# Patient Record
Sex: MALE | Race: OTHER
[De-identification: names, ages, dates, MRNs, and addresses within clinical notes are randomized per-mention and may not be internally consistent; named-entity substitution may affect disease eponyms.]

---

## 2021-05-12 ENCOUNTER — TRANSCRIPTION ENCOUNTER (OUTPATIENT)
Age: 71
End: 2021-05-12

## 2021-10-09 ENCOUNTER — TRANSCRIPTION ENCOUNTER (OUTPATIENT)
Age: 71
End: 2021-10-09

## 2021-11-08 PROBLEM — Z00.00 ENCOUNTER FOR PREVENTIVE HEALTH EXAMINATION: Status: ACTIVE | Noted: 2021-11-08

## 2022-05-29 ENCOUNTER — NON-APPOINTMENT (OUTPATIENT)
Age: 72
End: 2022-05-29

## 2022-07-10 ENCOUNTER — NON-APPOINTMENT (OUTPATIENT)
Age: 72
End: 2022-07-10

## 2023-07-04 ENCOUNTER — NON-APPOINTMENT (OUTPATIENT)
Age: 73
End: 2023-07-04

## 2023-08-26 ENCOUNTER — NON-APPOINTMENT (OUTPATIENT)
Age: 73
End: 2023-08-26

## 2023-09-15 ENCOUNTER — OUTPATIENT (OUTPATIENT)
Dept: OUTPATIENT SERVICES | Facility: HOSPITAL | Age: 73
LOS: 1 days | Discharge: HOME | End: 2023-09-15
Payer: MEDICARE

## 2023-09-15 VITALS
HEIGHT: 69 IN | HEART RATE: 82 BPM | RESPIRATION RATE: 17 BRPM | OXYGEN SATURATION: 97 % | SYSTOLIC BLOOD PRESSURE: 164 MMHG | TEMPERATURE: 97 F | DIASTOLIC BLOOD PRESSURE: 75 MMHG | WEIGHT: 199.96 LBS

## 2023-09-15 VITALS — HEART RATE: 71 BPM | RESPIRATION RATE: 15 BRPM | SYSTOLIC BLOOD PRESSURE: 186 MMHG | DIASTOLIC BLOOD PRESSURE: 76 MMHG

## 2023-09-15 DIAGNOSIS — Z98.890 OTHER SPECIFIED POSTPROCEDURAL STATES: Chronic | ICD-10-CM

## 2023-09-15 DIAGNOSIS — H25.12 AGE-RELATED NUCLEAR CATARACT, LEFT EYE: ICD-10-CM

## 2023-09-15 DIAGNOSIS — Z90.5 ACQUIRED ABSENCE OF KIDNEY: Chronic | ICD-10-CM

## 2023-09-15 DIAGNOSIS — Z98.49 CATARACT EXTRACTION STATUS, UNSPECIFIED EYE: Chronic | ICD-10-CM

## 2023-09-15 DIAGNOSIS — Z90.49 ACQUIRED ABSENCE OF OTHER SPECIFIED PARTS OF DIGESTIVE TRACT: Chronic | ICD-10-CM

## 2023-09-15 PROCEDURE — V2632: CPT

## 2023-09-15 NOTE — ASU PATIENT PROFILE, ADULT - NSICDXPASTMEDICALHX_GEN_ALL_CORE_FT
PAST MEDICAL HISTORY:  Colon cancer     History of bladder surgery     HLD (hyperlipidemia)     HTN (hypertension)     Prostate cancer     Ureter cancer, right

## 2023-09-15 NOTE — ASU PATIENT PROFILE, ADULT - FALL HARM RISK - HARM RISK INTERVENTIONS

## 2023-09-15 NOTE — ASU PATIENT PROFILE, ADULT - NSICDXPASTSURGICALHX_GEN_ALL_CORE_FT
PAST SURGICAL HISTORY:  H/O cataract extraction R iol    H/O right nephrectomy     History of bladder surgery     History of colon resection     History of kidney surgery W/ r ILLEO CONDUIT     PAST SURGICAL HISTORY:  H/O cataract extraction R iol    H/O right nephrectomy     History of bladder surgery     History of colon resection     History of kidney surgery R urostomy

## 2023-09-15 NOTE — ASU DISCHARGE PLAN (ADULT/PEDIATRIC) - NS MD DC FALL RISK RISK
For information on Fall & Injury Prevention, visit: [email or web address] OR  [email or web address] OR  https://www.cdc.gov/steadi/patient.html

## 2023-09-19 DIAGNOSIS — Z90.49 ACQUIRED ABSENCE OF OTHER SPECIFIED PARTS OF DIGESTIVE TRACT: ICD-10-CM

## 2023-09-19 DIAGNOSIS — Z98.49 CATARACT EXTRACTION STATUS, UNSPECIFIED EYE: ICD-10-CM

## 2023-09-19 DIAGNOSIS — E78.5 HYPERLIPIDEMIA, UNSPECIFIED: ICD-10-CM

## 2023-09-19 DIAGNOSIS — Z85.46 PERSONAL HISTORY OF MALIGNANT NEOPLASM OF PROSTATE: ICD-10-CM

## 2023-09-19 DIAGNOSIS — H25.89 OTHER AGE-RELATED CATARACT: ICD-10-CM

## 2023-09-19 DIAGNOSIS — I10 ESSENTIAL (PRIMARY) HYPERTENSION: ICD-10-CM

## 2023-09-19 DIAGNOSIS — Z85.038 PERSONAL HISTORY OF OTHER MALIGNANT NEOPLASM OF LARGE INTESTINE: ICD-10-CM

## 2023-11-16 ENCOUNTER — NON-APPOINTMENT (OUTPATIENT)
Age: 73
End: 2023-11-16

## 2024-04-01 NOTE — ASU PATIENT PROFILE, ADULT - TEACHING/LEARNING RELIGIOUS CONSIDERATIONS
In an effort to ensure that our patients LiveWell, a Team Member has reviewed your chart and identified an opportunity to provide the best care possible. An attempt was made to discuss or schedule overdue Preventive or Disease Management screening.     The Outcome was Contact was not made, left messageIf you have any questions or need help with scheduling, contact our Health Outreach Team at [phone]. Care Gaps include Immunizations and Wellness Visits.     none

## 2024-05-01 ENCOUNTER — NON-APPOINTMENT (OUTPATIENT)
Age: 74
End: 2024-05-01

## 2024-05-20 ENCOUNTER — NON-APPOINTMENT (OUTPATIENT)
Age: 74
End: 2024-05-20

## 2024-10-23 NOTE — ASU PREOP CHECKLIST - WARM FLUIDS/WARM BLANKETS
Detail Level: Detailed
Plan: Will have patient come back for evaluation in 6-8 weeks. Patient states lesions are caused by the bandages from open heart surgery 2 months ago. Will monitor.
no

## 2024-11-24 ENCOUNTER — INPATIENT (INPATIENT)
Facility: HOSPITAL | Age: 74
LOS: 2 days | Discharge: HOME | DRG: 683 | End: 2024-11-27
Payer: MEDICARE

## 2024-11-24 VITALS
DIASTOLIC BLOOD PRESSURE: 72 MMHG | WEIGHT: 190.04 LBS | HEART RATE: 86 BPM | SYSTOLIC BLOOD PRESSURE: 152 MMHG | RESPIRATION RATE: 18 BRPM | OXYGEN SATURATION: 97 % | TEMPERATURE: 98 F

## 2024-11-24 DIAGNOSIS — Z90.5 ACQUIRED ABSENCE OF KIDNEY: Chronic | ICD-10-CM

## 2024-11-24 DIAGNOSIS — Z98.49 CATARACT EXTRACTION STATUS, UNSPECIFIED EYE: Chronic | ICD-10-CM

## 2024-11-24 DIAGNOSIS — N17.9 ACUTE KIDNEY FAILURE, UNSPECIFIED: ICD-10-CM

## 2024-11-24 DIAGNOSIS — Z90.49 ACQUIRED ABSENCE OF OTHER SPECIFIED PARTS OF DIGESTIVE TRACT: Chronic | ICD-10-CM

## 2024-11-24 DIAGNOSIS — Z98.890 OTHER SPECIFIED POSTPROCEDURAL STATES: Chronic | ICD-10-CM

## 2024-11-24 PROBLEM — I10 ESSENTIAL (PRIMARY) HYPERTENSION: Chronic | Status: ACTIVE | Noted: 2023-09-15

## 2024-11-24 PROBLEM — C18.9 MALIGNANT NEOPLASM OF COLON, UNSPECIFIED: Chronic | Status: ACTIVE | Noted: 2023-09-15

## 2024-11-24 PROBLEM — E78.5 HYPERLIPIDEMIA, UNSPECIFIED: Chronic | Status: ACTIVE | Noted: 2023-09-15

## 2024-11-24 PROBLEM — C66.1 MALIGNANT NEOPLASM OF RIGHT URETER: Chronic | Status: ACTIVE | Noted: 2023-09-15

## 2024-11-24 PROBLEM — C61 MALIGNANT NEOPLASM OF PROSTATE: Chronic | Status: ACTIVE | Noted: 2023-09-15

## 2024-11-24 LAB
ALBUMIN SERPL ELPH-MCNC: 4.2 G/DL — SIGNIFICANT CHANGE UP (ref 3.5–5.2)
ALP SERPL-CCNC: 70 U/L — SIGNIFICANT CHANGE UP (ref 30–115)
ALT FLD-CCNC: 13 U/L — SIGNIFICANT CHANGE UP (ref 0–41)
ANION GAP SERPL CALC-SCNC: 14 MMOL/L — SIGNIFICANT CHANGE UP (ref 7–14)
ANION GAP SERPL CALC-SCNC: 17 MMOL/L — HIGH (ref 7–14)
AST SERPL-CCNC: 11 U/L — SIGNIFICANT CHANGE UP (ref 0–41)
BASE EXCESS BLDV CALC-SCNC: -19.6 MMOL/L — LOW (ref -2–3)
BASOPHILS # BLD AUTO: 0.02 K/UL — SIGNIFICANT CHANGE UP (ref 0–0.2)
BASOPHILS NFR BLD AUTO: 0.3 % — SIGNIFICANT CHANGE UP (ref 0–1)
BILIRUB SERPL-MCNC: 0.3 MG/DL — SIGNIFICANT CHANGE UP (ref 0.2–1.2)
BUN SERPL-MCNC: 107 MG/DL — CRITICAL HIGH (ref 10–20)
BUN SERPL-MCNC: 111 MG/DL — CRITICAL HIGH (ref 10–20)
CA-I SERPL-SCNC: 1.26 MMOL/L — SIGNIFICANT CHANGE UP (ref 1.15–1.33)
CALCIUM SERPL-MCNC: 8 MG/DL — LOW (ref 8.4–10.5)
CALCIUM SERPL-MCNC: 8.1 MG/DL — LOW (ref 8.4–10.5)
CHLORIDE SERPL-SCNC: 111 MMOL/L — HIGH (ref 98–110)
CHLORIDE SERPL-SCNC: 117 MMOL/L — HIGH (ref 98–110)
CO2 SERPL-SCNC: 12 MMOL/L — LOW (ref 17–32)
CO2 SERPL-SCNC: 9 MMOL/L — CRITICAL LOW (ref 17–32)
CREAT SERPL-MCNC: 6 MG/DL — CRITICAL HIGH (ref 0.7–1.5)
CREAT SERPL-MCNC: 6.4 MG/DL — CRITICAL HIGH (ref 0.7–1.5)
EGFR: 9 ML/MIN/1.73M2 — LOW
EGFR: 9 ML/MIN/1.73M2 — LOW
EOSINOPHIL # BLD AUTO: 0.34 K/UL — SIGNIFICANT CHANGE UP (ref 0–0.7)
EOSINOPHIL NFR BLD AUTO: 5.4 % — SIGNIFICANT CHANGE UP (ref 0–8)
GAS PNL BLDV: 136 MMOL/L — SIGNIFICANT CHANGE UP (ref 136–145)
GAS PNL BLDV: SIGNIFICANT CHANGE UP
GAS PNL BLDV: SIGNIFICANT CHANGE UP
GLUCOSE SERPL-MCNC: 87 MG/DL — SIGNIFICANT CHANGE UP (ref 70–99)
GLUCOSE SERPL-MCNC: 98 MG/DL — SIGNIFICANT CHANGE UP (ref 70–99)
HCO3 BLDV-SCNC: 9 MMOL/L — CRITICAL LOW (ref 22–29)
HCT VFR BLD CALC: 30.4 % — LOW (ref 42–52)
HCT VFR BLDA CALC: 26 % — LOW (ref 39–51)
HGB BLD CALC-MCNC: 8.5 G/DL — LOW (ref 12.6–17.4)
HGB BLD-MCNC: 9.5 G/DL — LOW (ref 14–18)
IMM GRANULOCYTES NFR BLD AUTO: 0.3 % — SIGNIFICANT CHANGE UP (ref 0.1–0.3)
LACTATE BLDV-MCNC: 0.4 MMOL/L — LOW (ref 0.5–2)
LYMPHOCYTES # BLD AUTO: 1.41 K/UL — SIGNIFICANT CHANGE UP (ref 1.2–3.4)
LYMPHOCYTES # BLD AUTO: 22.4 % — SIGNIFICANT CHANGE UP (ref 20.5–51.1)
MAGNESIUM SERPL-MCNC: 1.9 MG/DL — SIGNIFICANT CHANGE UP (ref 1.8–2.4)
MCHC RBC-ENTMCNC: 27.1 PG — SIGNIFICANT CHANGE UP (ref 27–31)
MCHC RBC-ENTMCNC: 31.3 G/DL — LOW (ref 32–37)
MCV RBC AUTO: 86.6 FL — SIGNIFICANT CHANGE UP (ref 80–94)
MONOCYTES # BLD AUTO: 0.66 K/UL — HIGH (ref 0.1–0.6)
MONOCYTES NFR BLD AUTO: 10.5 % — HIGH (ref 1.7–9.3)
MRSA PCR RESULT.: NEGATIVE — SIGNIFICANT CHANGE UP
NEUTROPHILS # BLD AUTO: 3.84 K/UL — SIGNIFICANT CHANGE UP (ref 1.4–6.5)
NEUTROPHILS NFR BLD AUTO: 61.1 % — SIGNIFICANT CHANGE UP (ref 42.2–75.2)
NRBC # BLD: 0 /100 WBCS — SIGNIFICANT CHANGE UP (ref 0–0)
PCO2 BLDV: 30 MMHG — LOW (ref 42–55)
PH BLDV: 7.08 — CRITICAL LOW (ref 7.32–7.43)
PHOSPHATE SERPL-MCNC: 5.2 MG/DL — HIGH (ref 2.1–4.9)
PLATELET # BLD AUTO: 232 K/UL — SIGNIFICANT CHANGE UP (ref 130–400)
PMV BLD: 9 FL — SIGNIFICANT CHANGE UP (ref 7.4–10.4)
PO2 BLDV: 41 MMHG — SIGNIFICANT CHANGE UP (ref 25–45)
POTASSIUM BLDV-SCNC: 4.6 MMOL/L — SIGNIFICANT CHANGE UP (ref 3.5–5.1)
POTASSIUM SERPL-MCNC: 4 MMOL/L — SIGNIFICANT CHANGE UP (ref 3.5–5)
POTASSIUM SERPL-MCNC: 4.3 MMOL/L — SIGNIFICANT CHANGE UP (ref 3.5–5)
POTASSIUM SERPL-SCNC: 4 MMOL/L — SIGNIFICANT CHANGE UP (ref 3.5–5)
POTASSIUM SERPL-SCNC: 4.3 MMOL/L — SIGNIFICANT CHANGE UP (ref 3.5–5)
PROT SERPL-MCNC: 7.1 G/DL — SIGNIFICANT CHANGE UP (ref 6–8)
RBC # BLD: 3.51 M/UL — LOW (ref 4.7–6.1)
RBC # FLD: 16.3 % — HIGH (ref 11.5–14.5)
SAO2 % BLDV: 65.6 % — LOW (ref 67–88)
SODIUM SERPL-SCNC: 137 MMOL/L — SIGNIFICANT CHANGE UP (ref 135–146)
SODIUM SERPL-SCNC: 143 MMOL/L — SIGNIFICANT CHANGE UP (ref 135–146)
WBC # BLD: 6.29 K/UL — SIGNIFICANT CHANGE UP (ref 4.8–10.8)
WBC # FLD AUTO: 6.29 K/UL — SIGNIFICANT CHANGE UP (ref 4.8–10.8)

## 2024-11-24 PROCEDURE — 82746 ASSAY OF FOLIC ACID SERUM: CPT

## 2024-11-24 PROCEDURE — 85610 PROTHROMBIN TIME: CPT

## 2024-11-24 PROCEDURE — 93010 ELECTROCARDIOGRAM REPORT: CPT

## 2024-11-24 PROCEDURE — 99285 EMERGENCY DEPT VISIT HI MDM: CPT

## 2024-11-24 PROCEDURE — 80048 BASIC METABOLIC PNL TOTAL CA: CPT

## 2024-11-24 PROCEDURE — 85379 FIBRIN DEGRADATION QUANT: CPT

## 2024-11-24 PROCEDURE — 76770 US EXAM ABDO BACK WALL COMP: CPT

## 2024-11-24 PROCEDURE — 76770 US EXAM ABDO BACK WALL COMP: CPT | Mod: 26

## 2024-11-24 PROCEDURE — 36415 COLL VENOUS BLD VENIPUNCTURE: CPT

## 2024-11-24 PROCEDURE — 83540 ASSAY OF IRON: CPT

## 2024-11-24 PROCEDURE — 82607 VITAMIN B-12: CPT

## 2024-11-24 PROCEDURE — 85025 COMPLETE CBC W/AUTO DIFF WBC: CPT

## 2024-11-24 PROCEDURE — 86900 BLOOD TYPING SEROLOGIC ABO: CPT

## 2024-11-24 PROCEDURE — 82728 ASSAY OF FERRITIN: CPT

## 2024-11-24 PROCEDURE — 86901 BLOOD TYPING SEROLOGIC RH(D): CPT

## 2024-11-24 PROCEDURE — 85730 THROMBOPLASTIN TIME PARTIAL: CPT

## 2024-11-24 PROCEDURE — 80053 COMPREHEN METABOLIC PANEL: CPT

## 2024-11-24 PROCEDURE — 84100 ASSAY OF PHOSPHORUS: CPT

## 2024-11-24 PROCEDURE — 71045 X-RAY EXAM CHEST 1 VIEW: CPT | Mod: 26

## 2024-11-24 PROCEDURE — 82306 VITAMIN D 25 HYDROXY: CPT

## 2024-11-24 PROCEDURE — 71045 X-RAY EXAM CHEST 1 VIEW: CPT

## 2024-11-24 PROCEDURE — 74176 CT ABD & PELVIS W/O CONTRAST: CPT | Mod: MC

## 2024-11-24 PROCEDURE — 87641 MR-STAPH DNA AMP PROBE: CPT

## 2024-11-24 PROCEDURE — 84466 ASSAY OF TRANSFERRIN: CPT

## 2024-11-24 PROCEDURE — 86850 RBC ANTIBODY SCREEN: CPT

## 2024-11-24 PROCEDURE — 83735 ASSAY OF MAGNESIUM: CPT

## 2024-11-24 PROCEDURE — 87640 STAPH A DNA AMP PROBE: CPT

## 2024-11-24 PROCEDURE — 74176 CT ABD & PELVIS W/O CONTRAST: CPT | Mod: 26

## 2024-11-24 PROCEDURE — 83550 IRON BINDING TEST: CPT

## 2024-11-24 RX ORDER — HEPARIN SODIUM,PORCINE 1000/ML
5000 VIAL (ML) INJECTION EVERY 12 HOURS
Refills: 0 | Status: DISCONTINUED | OUTPATIENT
Start: 2024-11-24 | End: 2024-11-27

## 2024-11-24 RX ORDER — EZETIMIBE 10 MG
1 TABLET ORAL
Refills: 0 | DISCHARGE

## 2024-11-24 RX ORDER — SENNOSIDES 8.6 MG
2 TABLET ORAL AT BEDTIME
Refills: 0 | Status: DISCONTINUED | OUTPATIENT
Start: 2024-11-24 | End: 2024-11-27

## 2024-11-24 RX ORDER — AMLODIPINE BESYLATE 10 MG/1
1 TABLET ORAL
Refills: 0 | DISCHARGE

## 2024-11-24 RX ORDER — INFLUENZA VIRUS VACCINE 15; 15; 15; 15 UG/.5ML; UG/.5ML; UG/.5ML; UG/.5ML
0.5 SUSPENSION INTRAMUSCULAR ONCE
Refills: 0 | Status: COMPLETED | OUTPATIENT
Start: 2024-11-24 | End: 2024-11-24

## 2024-11-24 RX ORDER — LYSINE HCL 500 MG
1 TABLET ORAL DAILY
Refills: 0 | Status: DISCONTINUED | OUTPATIENT
Start: 2024-11-24 | End: 2024-11-27

## 2024-11-24 RX ORDER — POLYETHYLENE GLYCOL 3350 17 G/17G
17 POWDER, FOR SOLUTION ORAL EVERY 12 HOURS
Refills: 0 | Status: DISCONTINUED | OUTPATIENT
Start: 2024-11-24 | End: 2024-11-27

## 2024-11-24 RX ORDER — PREGABALIN 75 MG/1
1 CAPSULE ORAL
Refills: 0 | DISCHARGE

## 2024-11-24 RX ORDER — CALCIUM ACETATE 667 MG/5ML
667 SOLUTION ORAL
Refills: 0 | Status: DISCONTINUED | OUTPATIENT
Start: 2024-11-24 | End: 2024-11-27

## 2024-11-24 RX ORDER — PANTOPRAZOLE SODIUM 40 MG/1
40 TABLET, DELAYED RELEASE ORAL
Refills: 0 | Status: DISCONTINUED | OUTPATIENT
Start: 2024-11-24 | End: 2024-11-27

## 2024-11-24 RX ORDER — AMLODIPINE BESYLATE 10 MG/1
10 TABLET ORAL DAILY
Refills: 0 | Status: DISCONTINUED | OUTPATIENT
Start: 2024-11-24 | End: 2024-11-27

## 2024-11-24 RX ORDER — EZETIMIBE 10 MG
10 TABLET ORAL DAILY
Refills: 0 | Status: DISCONTINUED | OUTPATIENT
Start: 2024-11-24 | End: 2024-11-27

## 2024-11-24 RX ORDER — SODIUM BICARBONATE 84 MG/ML
0.17 INJECTION, SOLUTION INTRAVENOUS
Qty: 150 | Refills: 0 | Status: DISCONTINUED | OUTPATIENT
Start: 2024-11-24 | End: 2024-11-26

## 2024-11-24 RX ORDER — CHLORHEXIDINE GLUCONATE 1.2 MG/ML
1 RINSE ORAL
Refills: 0 | Status: DISCONTINUED | OUTPATIENT
Start: 2024-11-24 | End: 2024-11-27

## 2024-11-24 RX ADMIN — CHLORHEXIDINE GLUCONATE 1 APPLICATION(S): 1.2 RINSE ORAL at 17:46

## 2024-11-24 RX ADMIN — Medication 5000 UNIT(S): at 17:46

## 2024-11-24 RX ADMIN — SODIUM BICARBONATE 100 MEQ/KG/HR: 84 INJECTION, SOLUTION INTRAVENOUS at 12:41

## 2024-11-24 RX ADMIN — CALCIUM ACETATE 667 MILLIGRAM(S): 667 SOLUTION ORAL at 17:46

## 2024-11-24 RX ADMIN — Medication 1 TABLET(S): at 19:01

## 2024-11-24 RX ADMIN — Medication 10 MILLIGRAM(S): at 17:46

## 2024-11-24 RX ADMIN — AMLODIPINE BESYLATE 10 MILLIGRAM(S): 10 TABLET ORAL at 17:46

## 2024-11-24 RX ADMIN — SODIUM BICARBONATE 100 MEQ/KG/HR: 84 INJECTION, SOLUTION INTRAVENOUS at 21:04

## 2024-11-24 NOTE — ED PROVIDER NOTE - CADM POA URETHRAL CATHETER
I wanted to let you know that Dr. Parham has messaged me that he is looking into how to do the CHUY scan.   No

## 2024-11-24 NOTE — H&P ADULT - NSHPREVIEWOFSYSTEMS_GEN_ALL_CORE
CONSTITUTIONAL: No weakness, fevers or chills  EYES/ENT: No visual changes;  No vertigo or throat pain   NECK: No pain or stiffness  RESPIRATORY: No cough, wheezing, hemoptysis; No shortness of breath  CARDIOVASCULAR: No chest pain or palpitations  GASTROINTESTINAL: as in hpi  GENITOURINARY:as in hpi  NEUROLOGICAL: No numbness or weakness  SKIN: No itching, rashes

## 2024-11-24 NOTE — ED PROVIDER NOTE - CARE PLAN
Principal Discharge DX:	Acute kidney injury superimposed on CKD  Secondary Diagnosis:	Metabolic acidosis   1

## 2024-11-24 NOTE — CONSULT NOTE ADULT - SUBJECTIVE AND OBJECTIVE BOX
NEPHROLOGY CONSULTATION NOTE    THIS CONSULT IS INCOMPLETE / FULL CONSULT TO FOLLOW    Patient is a 74y Male whom presented to the hospital with     PAST MEDICAL & SURGICAL HISTORY:  HLD (hyperlipidemia)      HTN (hypertension)      History of bladder surgery      Prostate cancer      Ureter cancer, right      Colon cancer      H/O right nephrectomy      History of colon resection      History of bladder surgery      History of kidney surgery  R urostomy      H/O cataract extraction  R iol        Allergies:  No Known Allergies    Home Medications Reviewed  Hospital Medications:   MEDICATIONS  (STANDING):      SOCIAL HISTORY:  Denies ETOH,Smoking,   FAMILY HISTORY:        REVIEW OF SYSTEMS:  CONSTITUTIONAL: No weakness, fevers or chills  EYES/ENT: No visual changes;  No vertigo or throat pain   NECK: No pain or stiffness  RESPIRATORY: No cough, wheezing, hemoptysis; No shortness of breath  CARDIOVASCULAR: No chest pain or palpitations.  GASTROINTESTINAL: No abdominal or epigastric pain. No nausea, vomiting, or hematemesis; No diarrhea or constipation. No melena or hematochezia.  GENITOURINARY: No dysuria, frequency, foamy urine, urinary urgency, incontinence or hematuria  NEUROLOGICAL: No numbness or weakness  SKIN: No itching, burning, rashes, or lesions   VASCULAR: No bilateral lower extremity edema.   All other review of systems is negative unless indicated above.    VITALS:  T(F): 97.9 (11-24-24 @ 08:48), Max: 97.9 (11-24-24 @ 08:48)  HR: 86 (11-24-24 @ 08:48)  BP: 152/72 (11-24-24 @ 08:48)  RR: 18 (11-24-24 @ 08:48)  SpO2: 97% (11-24-24 @ 08:48)      Weight (kg): 86.2 (11-24 @ 08:48)    I&O's Detail        PHYSICAL EXAM:  Constitutional: NAD  HEENT: anicteric sclera, oropharynx clear, MMM  Neck: No JVD  Respiratory: CTAB, no wheezes, rales or rhonchi  Cardiovascular: S1, S2, RRR  Gastrointestinal: BS+, soft, NT/ND  Extremities: No cyanosis or clubbing. No peripheral edema  Neurological: A/O x 3, no focal deficits  Psychiatric: Normal mood, normal affect  : No CVA tenderness. No ross.   Skin: No rashes  Vascular Access:    LABS:  11-24    137  |  111[H]  |  107[HH]  ----------------------------<  87  4.3   |  9[LL]  |  6.4[HH]    Ca    8.0[L]      24 Nov 2024 09:51  Phos  5.2     11-24  Mg     1.9     11-24    TPro  7.1  /  Alb  4.2  /  TBili  0.3  /  DBili      /  AST  11  /  ALT  13  /  AlkPhos  70  11-24    Creatinine Trend: 6.4 <--                        9.5    6.29  )-----------( 232      ( 24 Nov 2024 09:51 )             30.4     Urine Studies:  Urinalysis Basic - ( 24 Nov 2024 09:51 )    Color:  / Appearance:  / SG:  / pH:   Gluc: 87 mg/dL / Ketone:   / Bili:  / Urobili:    Blood:  / Protein:  / Nitrite:    Leuk Esterase:  / RBC:  / WBC    Sq Epi:  / Non Sq Epi:  / Bacteria:                     RADIOLOGY & ADDITIONAL STUDIES:                 Pt out of or to recovery. O2 sat 89% placed 02 2liters n/c pt sleepy.  No c/o pain NEPHROLOGY CONSULTATION NOTE    74-year-old man past medical history significant for hypertension, CKD 4 , dyslipidemia, urothelial carcinoma status post right nephrectomy with urostomy, colon cancer, (colon resection), prostate cancer, reports his last immunotherapy treatment was 1.5 years ago presenting for worsening kidney function acidosis and symptoms of uremia. PAtient said he ahd poor appetite for last 2 weeks , had mouth metallic taste and has noticed decreased urine output .  Seen in Ed lying in bed comfortable , family at bedside , denied any NSAIDs intake     PAST MEDICAL & SURGICAL HISTORY:  HLD (hyperlipidemia)  HTN (hypertension)  History of bladder surgery  Prostate cancer  Ureter cancer, right  Colon cancer  H/O right nephrectomy  History of colon resection  History of bladder surgery  History of kidney surgery R urostomy  H/O cataract extraction          Allergies:  No Known Allergies    Home Medications Reviewed  Hospital Medications:   MEDICATIONS  (STANDING):      SOCIAL HISTORY:  Denies ETOH,Smoking,   FAMILY HISTORY:        REVIEW OF SYSTEMS:   All other review of systems is negative unless indicated above.    VITALS:  T(F): 97.9 (11-24-24 @ 08:48), Max: 97.9 (11-24-24 @ 08:48)  HR: 86 (11-24-24 @ 08:48)  BP: 152/72 (11-24-24 @ 08:48)  RR: 18 (11-24-24 @ 08:48)  SpO2: 97% (11-24-24 @ 08:48)      Weight (kg): 86.2 (11-24 @ 08:48)    I&O's Detail        PHYSICAL EXAM:  Constitutional: NAD  Respiratory: CTAB, no wheezes, rales or rhonchi  Cardiovascular: S1, S2, RRR  Gastrointestinal: BS+, soft, NT/ND  Extremities: No cyanosis or clubbing. No peripheral edema  : No CVA tenderness. No ross.   Skin: No rashes  Vascular Access:    LABS:  11-24    137  |  111[H]  |  107[HH]  ----------------------------<  87  4.3   |  9[LL]  |  6.4[HH]    Ca    8.0[L]      24 Nov 2024 09:51  Phos  5.2     11-24  Mg     1.9     11-24    TPro  7.1  /  Alb  4.2  /  TBili  0.3  /  DBili      /  AST  11  /  ALT  13  /  AlkPhos  70  11-24    Creatinine Trend: 6.4 <--                        9.5    6.29  )-----------( 232      ( 24 Nov 2024 09:51 )             30.4     Urine Studies:  Urinalysis Basic - ( 24 Nov 2024 09:51 )    Color:  / Appearance:  / SG:  / pH:   Gluc: 87 mg/dL / Ketone:   / Bili:  / Urobili:    Blood:  / Protein:  / Nitrite:    Leuk Esterase:  / RBC:  / WBC    Sq Epi:  / Non Sq Epi:  / Bacteria:         RADIOLOGY & ADDITIONAL STUDIES:

## 2024-11-24 NOTE — ED PROVIDER NOTE - CLINICAL SUMMARY MEDICAL DECISION MAKING FREE TEXT BOX
74-year-old male past medical history as documented sent to the ED by his nephrologist with worsening renal failure for possible dialysis.  All labs reviewed.  Case discussed with nephrology, Dr. Deven Craig.  EKG with no ischemia or arrhythmia.  Patient with a metabolic acidosis.  Patient admitted to medicine.

## 2024-11-24 NOTE — ED ADULT TRIAGE NOTE - CHIEF COMPLAINT QUOTE
sent in by PMD for dialysis, had blood work done yesterday and told today to come to the ER for dialysis

## 2024-11-24 NOTE — ED PROVIDER NOTE - OBJECTIVE STATEMENT
74-year-old man past medical history significant for hypertension, dyslipidemia, urothelial carcinoma status post right nephrectomy with urostomy, colon cancer, (colon resection), prostate cancer, reports his last immunotherapy treatment was 1.5 years ago presents to the ED after he was told by his nephrologist to present to the ED for possible dialysis.  Patient complaining of 1-2 weeks of anorexia, dry mouth, intermittent hematuria and decreased urine output.  No nausea, vomiting.  No diarrhea.  No fever, chills.  No cough or URI symptoms.  No abdominal pain or back pain.

## 2024-11-24 NOTE — H&P ADULT - NSHPLABSRESULTS_GEN_ALL_CORE
CBC:            9.5    6.29  )-----------( 232      ( 11-24-24 @ 09:51 )             30.4         Chem:         ( 11-24-24 @ 09:51 )    137  |  111[H]  |  107[HH]  ----------------------------<  87  4.3   |  9[LL]  |  6.4[HH]        Liver Functions: ( 11-24-24 @ 09:51 )  Alb: 4.2 g/dL / Pro: 7.1 g/dL / ALK PHOS: 70 U/L / ALT: 13 U/L / AST: 11 U/L / GGT: x              Type & Screen:

## 2024-11-24 NOTE — H&P ADULT - ASSESSMENT
74-year-old man past medical history significant for hypertension, dyslipidemia, CKD 4, nephrolithiasis, urothelial carcinoma status post nephrectomy and bladder removal, with ileal conduit urinary diversion urostomy, colon cancer?, (patient says he has colon resection but not clear if because colon cancer or for ileal conduit), prostate cancer, reports his last immunotherapy treatment was 1.5 years ago after he had a neck lymph node biopsy, presents to the ED after he was told by his nephrologist to present to the ED for possible dialysis.       #KIM on CKD VS CKD progression   #HAGMA   - vitals: VS besides /71, satting 96% on RA.  - euvolemic on exam, CXR: wnl.   - Cr 6.4 (baseline around 4), , AG 17, corrected Ca 8, PO4 5.2, vbg PH 7.08, CO2 30, HCO3 9.  - Patient complaining of 1 week of anorexia, nausea (no vomiting), dry mouth, intermittent hematuria and decreased urine output. He says one week ago the urostomy bag stopped giving output because it was clogged but then it started draining again.   - nephrology: check renal bladder US, check UA and U lytes, strict I and O, IVF bicarb drip at 100 cc per hour, follow BMP check hepatitis profile check Fe studies, if no improvement may need to start RRT this hospitalization  - Impression: need to rule out obstruction given hx of RCC and nephrolithiasis.     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    Plan:  - obtain records from oncologist in [place].         #Anemia  - labs: hb 9.5 (baseline 8-9), MCV 86  - most likely anemia of chronic disease 2/2 ESRD  - follow up anemia work up and trend cbc, active t and s     #DLD  #HTN  - c/w home meds    #Constipation  - abd sof lax, distended   - bowel regimen       Full code  DVT proph heparin  GI proph PPI  diet renal  74-year-old man past medical history significant for hypertension, dyslipidemia, CKD 4, nephrolithiasis, urothelial carcinoma status post nephrectomy and bladder removal, with ileal conduit urinary diversion urostomy, colon cancer?, (patient says he has colon resection but not clear if because colon cancer or for ileal conduit), prostate cancer, reports his last immunotherapy treatment was 1.5 years ago after he had a neck lymph node biopsy, presents to the ED after he was told by his nephrologist to present to the ED for possible dialysis.       #KIM on CKD VS CKD progression   #HAGMA   - vitals: VS besides /71, satting 96% on RA.  - euvolemic on exam, CXR: wnl.   - Cr 6.4 (baseline around 4), , AG 17, corrected Ca 8, PO4 5.2, vbg PH 7.08, CO2 30, HCO3 9.  - Patient complaining of 1 week of anorexia, nausea (no vomiting), dry mouth, intermittent hematuria and decreased urine output. He says one week ago the urostomy bag stopped giving output because it was clogged but then it started draining again.   - nephrology: check renal bladder US, check UA and U lytes, strict I and O, IVF bicarb drip at 100 cc per hour, follow BMP check hepatitis profile check Fe studies, if no improvement may need to start RRT this hospitalization  - Impression: need to rule out obstruction given hx of RCC and nephrolithiasis.     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    Plan:  - obtain records from oncologist in [place].         #Anemia  - labs: hb 9.5 (baseline 8-9), MCV 86  - most likely anemia of chronic disease 2/2 ESRD  - follow up anemia work up and trend cbc, active t and s     #DLD  #HTN  - c/w home meds    #Constipation  - abd sof lax, distended   - bowel regimen       Full code  DVT proph heparin  GI proph PPI  diet renal  74-year-old man past medical history significant for hypertension, dyslipidemia, CKD 4, nephrolithiasis, urothelial carcinoma status post nephrectomy and bladder removal, with ileal conduit urinary diversion urostomy, colon cancer?, (patient says he has colon resection but not clear if because colon cancer or for ileal conduit), prostate cancer, reports his last immunotherapy treatment was 1.5 years ago after he had a neck lymph node biopsy, presents to the ED after he was told by his nephrologist to present to the ED for possible dialysis.       #KIM on CKD VS CKD progression   #HAGMA   - vitals: VS besides /71, satting 96% on RA.  - euvolemic on exam, CXR: wnl.   - Cr 6.4 (baseline around 4), , AG 17, corrected Ca 8, PO4 5.2, vbg PH 7.08, CO2 30, HCO3 9.  - Patient complaining of 1 week of anorexia, nausea (no vomiting), dry mouth, intermittent hematuria and decreased urine output. He says one week ago the urostomy bag stopped giving output because it was clogged but then it started draining again.   - nephrology: check renal bladder US, check UA and U lytes, strict I and O, IVF bicarb drip at 100 cc per hour, follow BMP check hepatitis profile check Fe studies, if no improvement may need to start RRT this hospitalization  - Impression: need to rule out obstruction given hx of RCC and nephrolithiasis.     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    Plan:  - obtain records from oncologist in [place].         #Anemia  - labs: hb 9.5 (baseline 8-9), MCV 86  - most likely anemia of chronic disease 2/2 ESRD  - follow up anemia work up and trend cbc, active t and s     #DLD  #HTN  - c/w home meds    #Constipation  - abd sof lax, distended   - bowel regimen       Full code  DVT proph heparin  GI proph PPI  diet renal

## 2024-11-24 NOTE — H&P ADULT - NSHPPHYSICALEXAM_GEN_ALL_CORE
T(C): 35.6 (11-24-24 @ 13:02), Max: 36.6 (11-24-24 @ 08:48)  HR: 72 (11-24-24 @ 13:02) (72 - 86)  BP: 146/69 (11-24-24 @ 13:02) (146/69 - 152/72)  RR: 20 (11-24-24 @ 13:02) (18 - 20)  SpO2: 100% (11-24-24 @ 13:02) (97% - 100%)    CONSTITUTIONAL: Well groomed, no apparent distress  EYES: PERRLA and symmetric, EOMI, No conjunctival or scleral injection, non-icteric  ENMT: Oral mucosa with moist membranes. Normal dentition; no pharyngeal injection or exudates             NECK: Supple, symmetric and without tracheal deviation   RESP: No respiratory distress, no use of accessory muscles; CTA b/l, no WRR  CV: RRR, +S1S2, no MRG; no JVD; no peripheral edema  GI: Soft, NT, ND, no rebound, no guarding; no palpable masses; no hepatosplenomegaly; no hernia palpated  LYMPH: No cervical LAD or tenderness; no axillary LAD or tenderness; no inguinal LAD or tenderness  [place]: Normal gait; No digital clubbing or cyanosis; examination of the (head/neck/spine/ribs/pelvis, RUE, LUE, RLE, LLE) without misalignment,            Normal ROM without pain, no spinal tenderness, normal muscle strength/tone  SKIN: No rashes or ulcers noted; no subcutaneous nodules or induration palpable  NEURO: CN II-XII intact; normal reflexes in upper and lower extremities, sensation intact in upper and lower extremities b/l to light touch   PSYCH: Appropriate insight/judgment; A+O x 3, mood and affect appropriate, recent/remote memory intact

## 2024-11-24 NOTE — ED ADULT NURSE NOTE - NSICDXPASTSURGICALHX_GEN_ALL_CORE_FT
PAST SURGICAL HISTORY:  H/O cataract extraction R iol    H/O right nephrectomy     History of bladder surgery     History of colon resection     History of kidney surgery R urostomy

## 2024-11-24 NOTE — ED ADULT NURSE NOTE - NSFALLUNIVINTERV_ED_ALL_ED
Bed/Stretcher in lowest position, wheels locked, appropriate side rails in place/Call bell, personal items and telephone in reach/Instruct patient to call for assistance before getting out of bed/chair/stretcher/Non-slip footwear applied when patient is off stretcher/[place] to call system/Physically safe environment - no spills, clutter or unnecessary equipment/Purposeful proactive rounding/Room/bathroom lighting operational, light cord in reach

## 2024-11-24 NOTE — ED PROVIDER NOTE - PHYSICAL EXAMINATION
CONSTITUTIONAL: Well-appearing; well-nourished; in no apparent distress.   HEAD: Normocephalic; atraumatic.   EYES: PERRL; EOM intact. Conjunctiva normal B/L.   ENT: Normal pharynx with no tonsillar hypertrophy. MMM.  NECK: Supple; non-tender; no cervical lymphadenopathy.   CHEST: Normal chest excursion with respiration.   CARDIOVASCULAR: Normal S1, S2; no murmurs, rubs, or gallops.   RESPIRATORY: Normal chest excursion with respiration; breath sounds clear and equal bilaterally; no wheezes, rhonchi, or rales.  GI/: Normal bowel sounds; non-distended; non-tender.+ urostomy. stoma pink.   BACK: No evidence of trauma or deformity. Non-tender to palpation. No CVA tenderness.   EXT: Normal ROM in all four extremities; non-tender to palpation; distal pulses are normal. No leg edema B/L.   SKIN: Normal for age and race; warm; dry; good turgor.  NEURO: A & O x 4; CN 2-12 intact. Grossly unremarkable.

## 2024-11-24 NOTE — CONSULT NOTE ADULT - ASSESSMENT
74-year-old man past medical history significant for hypertension, CKD 4 , dyslipidemia, urothelial carcinoma status post right nephrectomy with urostomy, colon cancer, (colon resection), prostate cancer, reports his last immunotherapy treatment was 1.5 years ago presenting for worsening kidney function    # KIM on CKD 4 vs progression of CKD  # Acidosis high anion gap   # RCC sp right nephrectomy   # anemia chronic     - check renal bladder US  - check UA and U lytes   - strict I and O  - IVF bicarb drip at 100 cc per hour  - follow BMP check hepatitis profile check Fe studies   - if no improvement may need to start RRT this hospitalization    will follow

## 2024-11-25 LAB
24R-OH-CALCIDIOL SERPL-MCNC: 15 NG/ML — LOW (ref 30–80)
ALBUMIN SERPL ELPH-MCNC: 3.8 G/DL — SIGNIFICANT CHANGE UP (ref 3.5–5.2)
ALP SERPL-CCNC: 61 U/L — SIGNIFICANT CHANGE UP (ref 30–115)
ALT FLD-CCNC: 11 U/L — SIGNIFICANT CHANGE UP (ref 0–41)
ANION GAP SERPL CALC-SCNC: 13 MMOL/L — SIGNIFICANT CHANGE UP (ref 7–14)
APTT BLD: 27.7 SEC — SIGNIFICANT CHANGE UP (ref 27–39.2)
AST SERPL-CCNC: 8 U/L — SIGNIFICANT CHANGE UP (ref 0–41)
BASOPHILS # BLD AUTO: 0.04 K/UL — SIGNIFICANT CHANGE UP (ref 0–0.2)
BASOPHILS NFR BLD AUTO: 0.8 % — SIGNIFICANT CHANGE UP (ref 0–1)
BILIRUB SERPL-MCNC: 0.2 MG/DL — SIGNIFICANT CHANGE UP (ref 0.2–1.2)
BUN SERPL-MCNC: 111 MG/DL — CRITICAL HIGH (ref 10–20)
CALCIUM SERPL-MCNC: 8.1 MG/DL — LOW (ref 8.4–10.5)
CHLORIDE SERPL-SCNC: 116 MMOL/L — HIGH (ref 98–110)
CO2 SERPL-SCNC: 15 MMOL/L — LOW (ref 17–32)
CREAT SERPL-MCNC: 6.1 MG/DL — CRITICAL HIGH (ref 0.7–1.5)
D DIMER BLD IA.RAPID-MCNC: 219 NG/ML DDU — SIGNIFICANT CHANGE UP
EGFR: 9 ML/MIN/1.73M2 — LOW
EOSINOPHIL # BLD AUTO: 0.26 K/UL — SIGNIFICANT CHANGE UP (ref 0–0.7)
EOSINOPHIL NFR BLD AUTO: 5.5 % — SIGNIFICANT CHANGE UP (ref 0–8)
FERRITIN SERPL-MCNC: 166 NG/ML — SIGNIFICANT CHANGE UP (ref 30–400)
FOLATE SERPL-MCNC: 5.5 NG/ML — SIGNIFICANT CHANGE UP
GLUCOSE SERPL-MCNC: 101 MG/DL — HIGH (ref 70–99)
HCT VFR BLD CALC: 25.2 % — LOW (ref 42–52)
HGB BLD-MCNC: 8 G/DL — LOW (ref 14–18)
IMM GRANULOCYTES NFR BLD AUTO: 0.2 % — SIGNIFICANT CHANGE UP (ref 0.1–0.3)
INR BLD: 0.94 RATIO — SIGNIFICANT CHANGE UP (ref 0.65–1.3)
IRON SATN MFR SERPL: 31 % — SIGNIFICANT CHANGE UP (ref 15–50)
IRON SATN MFR SERPL: 58 UG/DL — SIGNIFICANT CHANGE UP (ref 35–150)
LYMPHOCYTES # BLD AUTO: 1.19 K/UL — LOW (ref 1.2–3.4)
LYMPHOCYTES # BLD AUTO: 25.1 % — SIGNIFICANT CHANGE UP (ref 20.5–51.1)
MAGNESIUM SERPL-MCNC: 1.8 MG/DL — SIGNIFICANT CHANGE UP (ref 1.8–2.4)
MCHC RBC-ENTMCNC: 26.9 PG — LOW (ref 27–31)
MCHC RBC-ENTMCNC: 31.7 G/DL — LOW (ref 32–37)
MCV RBC AUTO: 84.8 FL — SIGNIFICANT CHANGE UP (ref 80–94)
MONOCYTES # BLD AUTO: 0.46 K/UL — SIGNIFICANT CHANGE UP (ref 0.1–0.6)
MONOCYTES NFR BLD AUTO: 9.7 % — HIGH (ref 1.7–9.3)
NEUTROPHILS # BLD AUTO: 2.79 K/UL — SIGNIFICANT CHANGE UP (ref 1.4–6.5)
NEUTROPHILS NFR BLD AUTO: 58.7 % — SIGNIFICANT CHANGE UP (ref 42.2–75.2)
NRBC # BLD: 0 /100 WBCS — SIGNIFICANT CHANGE UP (ref 0–0)
PHOSPHATE SERPL-MCNC: 5.2 MG/DL — HIGH (ref 2.1–4.9)
PLATELET # BLD AUTO: 207 K/UL — SIGNIFICANT CHANGE UP (ref 130–400)
PMV BLD: 9.2 FL — SIGNIFICANT CHANGE UP (ref 7.4–10.4)
POTASSIUM SERPL-MCNC: 3.7 MMOL/L — SIGNIFICANT CHANGE UP (ref 3.5–5)
POTASSIUM SERPL-SCNC: 3.7 MMOL/L — SIGNIFICANT CHANGE UP (ref 3.5–5)
PROT SERPL-MCNC: 6 G/DL — SIGNIFICANT CHANGE UP (ref 6–8)
PROTHROM AB SERPL-ACNC: 11.1 SEC — SIGNIFICANT CHANGE UP (ref 9.95–12.87)
RBC # BLD: 2.97 M/UL — LOW (ref 4.7–6.1)
RBC # FLD: 15.9 % — HIGH (ref 11.5–14.5)
SODIUM SERPL-SCNC: 144 MMOL/L — SIGNIFICANT CHANGE UP (ref 135–146)
TIBC SERPL-MCNC: 188 UG/DL — LOW (ref 220–430)
TRANSFERRIN SERPL-MCNC: 167 MG/DL — LOW (ref 200–360)
UIBC SERPL-MCNC: 130 UG/DL — SIGNIFICANT CHANGE UP (ref 110–370)
VIT B12 SERPL-MCNC: 339 PG/ML — SIGNIFICANT CHANGE UP (ref 232–1245)
WBC # BLD: 4.75 K/UL — LOW (ref 4.8–10.8)
WBC # FLD AUTO: 4.75 K/UL — LOW (ref 4.8–10.8)

## 2024-11-25 PROCEDURE — 99223 1ST HOSP IP/OBS HIGH 75: CPT

## 2024-11-25 RX ORDER — SODIUM BICARBONATE 84 MG/ML
1300 INJECTION, SOLUTION INTRAVENOUS EVERY 8 HOURS
Refills: 0 | Status: DISCONTINUED | OUTPATIENT
Start: 2024-11-25 | End: 2024-11-27

## 2024-11-25 RX ORDER — ACETAMINOPHEN 500MG 500 MG/1
650 TABLET, COATED ORAL ONCE
Refills: 0 | Status: COMPLETED | OUTPATIENT
Start: 2024-11-25 | End: 2024-11-25

## 2024-11-25 RX ADMIN — ACETAMINOPHEN 500MG 650 MILLIGRAM(S): 500 TABLET, COATED ORAL at 21:46

## 2024-11-25 RX ADMIN — CALCIUM ACETATE 667 MILLIGRAM(S): 667 SOLUTION ORAL at 18:35

## 2024-11-25 RX ADMIN — SODIUM BICARBONATE 1300 MILLIGRAM(S): 84 INJECTION, SOLUTION INTRAVENOUS at 21:47

## 2024-11-25 RX ADMIN — POLYETHYLENE GLYCOL 3350 17 GRAM(S): 17 POWDER, FOR SOLUTION ORAL at 18:34

## 2024-11-25 RX ADMIN — Medication 10 MILLIGRAM(S): at 18:26

## 2024-11-25 RX ADMIN — CALCIUM ACETATE 667 MILLIGRAM(S): 667 SOLUTION ORAL at 12:25

## 2024-11-25 RX ADMIN — POLYETHYLENE GLYCOL 3350 17 GRAM(S): 17 POWDER, FOR SOLUTION ORAL at 05:33

## 2024-11-25 RX ADMIN — CALCIUM ACETATE 667 MILLIGRAM(S): 667 SOLUTION ORAL at 08:48

## 2024-11-25 RX ADMIN — SODIUM BICARBONATE 100 MEQ/KG/HR: 84 INJECTION, SOLUTION INTRAVENOUS at 23:26

## 2024-11-25 RX ADMIN — PANTOPRAZOLE SODIUM 40 MILLIGRAM(S): 40 TABLET, DELAYED RELEASE ORAL at 05:32

## 2024-11-25 RX ADMIN — Medication 5000 UNIT(S): at 18:35

## 2024-11-25 RX ADMIN — SODIUM BICARBONATE 1300 MILLIGRAM(S): 84 INJECTION, SOLUTION INTRAVENOUS at 12:33

## 2024-11-25 RX ADMIN — Medication 1 TABLET(S): at 18:26

## 2024-11-25 RX ADMIN — AMLODIPINE BESYLATE 10 MILLIGRAM(S): 10 TABLET ORAL at 05:33

## 2024-11-25 NOTE — PROGRESS NOTE ADULT - SUBJECTIVE AND OBJECTIVE BOX
seen and examined  24 h events noted   no new complaints         PAST HISTORY  --------------------------------------------------------------------------------  No significant changes to PMH, PSH, FHx, SHx, unless otherwise noted    ALLERGIES & MEDICATIONS  --------------------------------------------------------------------------------  Allergies    No Known Allergies    Intolerances      Standing Inpatient Medications  amLODIPine   Tablet 10 milliGRAM(s) Oral daily  calcium acetate 667 milliGRAM(s) Oral three times a day with meals  calcium carbonate 1250 mG  + Vitamin D (OsCal 500 + D) 1 Tablet(s) Oral daily  chlorhexidine 2% Cloths 1 Application(s) Topical <User Schedule>  ezetimibe 10 milliGRAM(s) Oral daily  heparin   Injectable 5000 Unit(s) SubCutaneous every 12 hours  influenza  Vaccine (HIGH DOSE) 0.5 milliLiter(s) IntraMuscular once  pantoprazole    Tablet 40 milliGRAM(s) Oral before breakfast  polyethylene glycol 3350 17 Gram(s) Oral every 12 hours  senna 2 Tablet(s) Oral at bedtime  sodium bicarbonate  Infusion 0.174 mEq/kG/Hr IV Continuous <Continuous>    PRN Inpatient Medications      VITALS/PHYSICAL EXAM  --------------------------------------------------------------------------------  T(C): 36.2 (11-25-24 @ 04:00), Max: 36.6 (11-24-24 @ 08:48)  HR: 74 (11-25-24 @ 04:00) (67 - 86)  BP: 130/68 (11-25-24 @ 04:00) (130/68 - 152/72)  RR: 20 (11-25-24 @ 04:00) (18 - 20)  SpO2: 96% (11-25-24 @ 04:00) (96% - 100%)  Wt(kg): --    Weight (kg): 86.2 (11-24-24 @ 08:48)      11-24-24 @ 07:01  -  11-25-24 @ 07:00  --------------------------------------------------------  IN: 1850 mL / OUT: 2000 mL / NET: -150 mL      Physical Exam:  	Gen: NAD, dry mucosa   	Pulm: CTA B/L  	CV: S1S2; no rub  	Abd: +urostomy   	LE:  no edema  	    LABS/STUDIES  --------------------------------------------------------------------------------              8.0    4.75  >-----------<  207      [11-25-24 @ 04:55]              25.2     144  |  116  |  111  ----------------------------<  101      [11-25-24 @ 04:55]  3.7   |  15  |  6.1        Ca     8.1     [11-25-24 @ 04:55]      Mg     1.8     [11-25-24 @ 04:55]      Phos  5.2     [11-25-24 @ 04:55]    TPro  6.0  /  Alb  3.8  /  TBili  0.2  /  DBili  x   /  AST  8   /  ALT  11  /  AlkPhos  61  [11-25-24 @ 04:55]    PT/INR: PT 11.10, INR 0.94       [11-25-24 @ 04:55]  PTT: 27.7       [11-25-24 @ 04:55]      Creatinine Trend:  SCr 6.1 [11-25 @ 04:55]  SCr 6.0 [11-24 @ 22:08]  SCr 6.4 [11-24 @ 09:51]    Urinalysis - [11-25-24 @ 04:55]      Color  / Appearance  / SG  / pH       Gluc 101 / Ketone   / Bili  / Urobili        Blood  / Protein  / Leuk Est  / Nitrite       RBC  / WBC  / Hyaline  / Gran  / Sq Epi  / Non Sq Epi  / Bacteria       Iron 58, TIBC 188, %sat 31      [11-25-24 @ 04:55]

## 2024-11-25 NOTE — CONSULT NOTE ADULT - SUBJECTIVE AND OBJECTIVE BOX
Patient is a 74y old  Male who presents with a chief complaint of nico on ckd (25 Nov 2024 07:12)      HPI:  74-year-old man past medical history significant for hypertension, dyslipidemia, CKD 4, nephrolithiasis, urothelial carcinoma status post nephrectomy and bladder removal, with ileal conduit urinary diversion urostomy, colon cancer?, (patient says he has colon resection but not clear if because colon cancer or for ileal conduit), prostate cancer, reports his last immunotherapy treatment was 1.5 years ago after he had a neck lymph node biopsy, presents to the ED after he was told by his nephrologist to present to the ED for possible dialysis.  Patient complaining of 1 week of anorexia, nausea (no vomiting), dry mouth, intermittent hematuria and decreased urine output. He also endorses constipation and decreased Apetite. No fever, chills.  No cough or URI symptoms.  No abdominal pain or back pain or any other symptoms.        ED course:  - vitals: VS besides /71, satting 96% on RA.  - labs: hb 9.5 (baseline 8-9), Cr 6.4 (baseline around 4), , AG 17, corrected Ca 8, PO4 5.2, vbg PH 7.08, CO2 30, HCO3 9.  - CXR: wnl.     Admitted for HAGMA 2/2 renal failure.  (24 Nov 2024 14:29)      PAST MEDICAL & SURGICAL HISTORY:  HLD (hyperlipidemia)      HTN (hypertension)      History of bladder surgery      Prostate cancer      Ureter cancer, right      Colon cancer      H/O right nephrectomy      History of colon resection      History of bladder surgery      History of kidney surgery  R urostomy      H/O cataract extraction  R iol          SOCIAL HX:  no Smoking                             FAMILY HISTORY:  :  No known cardiovacular family hisotry     Review Of Systems:     All ROS are negative except per HPI       Allergies    No Known Allergies    Intolerances          PHYSICAL EXAM    ICU Vital Signs Last 24 Hrs  T(C): 36.2 (25 Nov 2024 04:00), Max: 36.2 (25 Nov 2024 04:00)  T(F): 97.1 (25 Nov 2024 04:00), Max: 97.1 (25 Nov 2024 04:00)  HR: 82 (25 Nov 2024 08:00) (67 - 82)  BP: 139/68 (25 Nov 2024 08:00) (130/68 - 146/79)  BP(mean): 97 (25 Nov 2024 08:00) (91 - 99)  ABP: --  ABP(mean): --  RR: 20 (25 Nov 2024 08:00) (20 - 20)  SpO2: 97% (25 Nov 2024 08:48) (96% - 100%)    O2 Parameters below as of 25 Nov 2024 08:48  Patient On (Oxygen Delivery Method): room air            CONSTITUTIONAL:  NAD    ENT:   Airway patent,   Mouth with normal mucosa.     CARDIAC:   Normal rate,   Regular rhythm.    No edema      RESPIRATORY:   No wheezing  Bilateral BS   Not tachypneic,  No use of accessory muscles    GASTROINTESTINAL:  Abdomen soft,   Non-tender,   No guarding,   + BS  urostomy bag with clear urine      NEUROLOGICAL:   Alert and oriented   No motor deficits.    SKIN:   Skin normal color for race,   No evidence of rash.              11-24-24 @ 07:01  -  11-25-24 @ 07:00  --------------------------------------------------------  IN:    Oral Fluid: 250 mL    Sodium Bicarbonate: 1600 mL  Total IN: 1850 mL    OUT:    Blood Loss (mL): 0 mL    Urostomy (mL): 1000 mL    Voided (mL): 1000 mL  Total OUT: 2000 mL    Total NET: -150 mL          LABS:                          8.0    4.75  )-----------( 207      ( 25 Nov 2024 04:55 )             25.2                                               11-25    144  |  116[H]  |  111[HH]  ----------------------------<  101[H]  3.7   |  15[L]  |  6.1[HH]    Ca    8.1[L]      25 Nov 2024 04:55  Phos  5.2     11-25  Mg     1.8     11-25    TPro  6.0  /  Alb  3.8  /  TBili  0.2  /  DBili  x   /  AST  8   /  ALT  11  /  AlkPhos  61  11-25      PT/INR - ( 25 Nov 2024 04:55 )   PT: 11.10 sec;   INR: 0.94 ratio         PTT - ( 25 Nov 2024 04:55 )  PTT:27.7 sec                                       Urinalysis Basic - ( 25 Nov 2024 04:55 )    Color: x / Appearance: x / SG: x / pH: x  Gluc: 101 mg/dL / Ketone: x  / Bili: x / Urobili: x   Blood: x / Protein: x / Nitrite: x   Leuk Esterase: x / RBC: x / WBC x   Sq Epi: x / Non Sq Epi: x / Bacteria: x                                                  LIVER FUNCTIONS - ( 25 Nov 2024 04:55 )  Alb: 3.8 g/dL / Pro: 6.0 g/dL / ALK PHOS: 61 U/L / ALT: 11 U/L / AST: 8 U/L / GGT: x                                                                                                                                       X-Rays                                                                            ECHO      MEDICATIONS  (STANDING):  amLODIPine   Tablet 10 milliGRAM(s) Oral daily  calcium acetate 667 milliGRAM(s) Oral three times a day with meals  calcium carbonate 1250 mG  + Vitamin D (OsCal 500 + D) 1 Tablet(s) Oral daily  chlorhexidine 2% Cloths 1 Application(s) Topical <User Schedule>  ezetimibe 10 milliGRAM(s) Oral daily  heparin   Injectable 5000 Unit(s) SubCutaneous every 12 hours  pantoprazole    Tablet 40 milliGRAM(s) Oral before breakfast  polyethylene glycol 3350 17 Gram(s) Oral every 12 hours  senna 2 Tablet(s) Oral at bedtime  sodium bicarbonate 1300 milliGRAM(s) Oral every 8 hours  sodium bicarbonate  Infusion 0.174 mEq/kG/Hr (100 mL/Hr) IV Continuous <Continuous>    MEDICATIONS  (PRN):

## 2024-11-25 NOTE — PROGRESS NOTE ADULT - ASSESSMENT
74-year-old man past medical history significant for hypertension, CKD 4 , dyslipidemia, urothelial carcinoma status post right nephrectomy with urostomy, colon cancer, (colon resection), prostate cancer, reports his last immunotherapy treatment was 1.5 years ago presenting for worsening kidney function  # KIM on CKD 4   # Acidosis high anion gap   # RCC sp right nephrectomy   # anemia chronic   continue bicarbonate drip  start sodium bicarbonate 1300 po q 8   cr stable    non oliguric   check sono   bp controlled   ph at goal   no acute indication for RRT   will follow

## 2024-11-25 NOTE — PROGRESS NOTE ADULT - ASSESSMENT
74-year-old man past medical history significant for hypertension, dyslipidemia, CKD 4, nephrolithiasis, urothelial carcinoma status post nephrectomy and bladder removal, with ileal conduit urinary diversion urostomy, colon cancer?, (patient says he has colon resection but not clear if because colon cancer or for ileal conduit), prostate cancer, reports his last immunotherapy treatment was 1.5 years ago after he had a neck lymph node biopsy, presents to the ED after he was told by his nephrologist to present to the ED for possible dialysis.       #KIM on CKD VS CKD progression   #HAGMA   - Cr 6.1 from 6.4 on presentation (baseline around 4), , AG 17, corrected Ca 8, PO4 5.2, vbg PH 7.08, CO2 30, HCO3 9.  - urine output: 1000  PLAN  - nephrology: check renal bladder US, check UA and U lytes, strict I and O, IVF bicarb drip at 100 cc per hour, follow BMP check hepatitis profile check Fe studies, if no improvement may need to start RRT this hospitalization  - Impression: need to rule out obstruction given hx of RCC and nephrolithiasis.       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  PLAN  - obtain records from oncologist in [facility] [place]         #Anemia  - labs: hb 8 from  9.5 (baseline 8-9), MCV 86  - most likely anemia of chronic disease 2/2 ESRD  PLAN  - follow up anemia work up and trend cbc, active t and s     #DLD  #HTN  - c/w home meds    #Constipation  - abd sof lax, distended   - bowel regimen       Full code  DVT proph heparin  GI proph PPI  diet renal

## 2024-11-25 NOTE — CONSULT NOTE ADULT - ATTENDING COMMENTS
IMPRESSION:  Acute metabolic acidosis  KIM on CKD 4  HO Renal cancer s/p right nephrectomy and bladder resection  Left renal stone and cystic lesion with right urostomy      Plan as outlined above

## 2024-11-25 NOTE — PROGRESS NOTE ADULT - SUBJECTIVE AND OBJECTIVE BOX
SUBJECTIVE/OVERNIGHT EVENTS  Today is hospital day 1d. This morning patient was seen and examined at bedside, resting comfortably in bed. No acute or major events overnight. Patient having urine output into urostomy bag. Patient does not report any hematuria.       MEDICATIONS  STANDING MEDICATIONS  amLODIPine   Tablet 10 milliGRAM(s) Oral daily  calcium acetate 667 milliGRAM(s) Oral three times a day with meals  calcium carbonate 1250 mG  + Vitamin D (OsCal 500 + D) 1 Tablet(s) Oral daily  chlorhexidine 2% Cloths 1 Application(s) Topical <User Schedule>  ezetimibe 10 milliGRAM(s) Oral daily  heparin   Injectable 5000 Unit(s) SubCutaneous every 12 hours  pantoprazole    Tablet 40 milliGRAM(s) Oral before breakfast  polyethylene glycol 3350 17 Gram(s) Oral every 12 hours  senna 2 Tablet(s) Oral at bedtime  sodium bicarbonate 1300 milliGRAM(s) Oral every 8 hours  sodium bicarbonate  Infusion 0.174 mEq/kG/Hr IV Continuous <Continuous>    PRN MEDICATIONS    VITALS  T(F): 97.1 (11-25-24 @ 04:00), Max: 97.1 (11-25-24 @ 04:00)  HR: 82 (11-25-24 @ 08:00) (67 - 82)  BP: 139/68 (11-25-24 @ 08:00) (130/68 - 146/79)  RR: 20 (11-25-24 @ 08:00) (20 - 20)  SpO2: 97% (11-25-24 @ 08:48) (96% - 100%)      PHYSICAL EXAM:  GENERAL: NAD, well-groomed, well-developed  HEAD:  Atraumatic, Normocephalic  EYES: EOMI, PERRLA, conjunctiva and sclera clear  ENMT:  Moist mucous membranes  NECK: Supple, No JVD,  CHEST/LUNG: Clear to auscultation bilaterally  HEART: Regular rate and rhythm  ABDOMEN: Soft, Nontender, Nondistended; Bowel sounds present  NEURO:  MENTAL STATUS: AAOx3  LANG/SPEECH: Fluent  CRANIAL NERVES:  II: Pupils equal and reactive  III, IV, VI: EOM intact, no gaze preference or deviation  V: normal  VII: no facial asymmetry  VIII: normal hearing to speech  MOTOR: 5/5 in both upper and lower extremities  REFLEXES: 2/4 throughout, bilateral flexor plantars  SENSORY: Normal to touch  COORD:  Gait: Narrow based gait  EXTREMITIES: No LE edema, no calf tenderness  LYMPH: No lymphadenopathy noted  SKIN: No rashes or lesions         LABS             8.0    4.75  )-----------( 207      ( 11-25-24 @ 04:55 )             25.2     144  |  116  |  111  -------------------------<  101   11-25-24 @ 04:55  3.7  |  15  |  6.1    Ca      8.1     11-25-24 @ 04:55  Phos   5.2     11-25-24 @ 04:55  Mg     1.8     11-25-24 @ 04:55    TPro  6.0  /  Alb  3.8  /  TBili  0.2  /  DBili  x   /  AST  8   /  ALT  11  /  AlkPhos  61  /  GGT  x     11-25-24 @ 04:55    PT/INR - ( 11-25-24 @ 04:55 )   PT: 11.10 sec;   INR: 0.94 ratio  PTT - ( 11-25-24 @ 04:55 )  PTT:27.7 sec      Urinalysis Basic - ( 25 Nov 2024 04:55 )    Color: x / Appearance: x / SG: x / pH: x  Gluc: 101 mg/dL / Ketone: x  / Bili: x / Urobili: x   Blood: x / Protein: x / Nitrite: x   Leuk Esterase: x / RBC: x / WBC x   Sq Epi: x / Non Sq Epi: x / Bacteria: x          IMAGING      ASSESSMENT AND PLAN:

## 2024-11-25 NOTE — CONSULT NOTE ADULT - ASSESSMENT
IMPRESSION:  Acute metabolic acidosis  KIM on CKD 4  HO Renal cancer s/p right nephrectomy and bladder resection  Left renal stone and cystic lesion with right urostomy      PLAN:      CNS: avoid sedation    HEENT: Oral care    PULMONARY:  HOB @ 45 degrees.  Aspiration precautions,     CARDIOVASCULAR: avoid volume overload, MAP adequate    GI: GI prophylaxis.  Feeding.      RENAL:  Follow up lytes.  Correct as needed. On bicarb drip. VBG    INFECTIOUS DISEASE: Follow up cultures, procalcitonin    HEMATOLOGICAL:  DVT prophylaxis. DIMER    ENDOCRINE:  Follow up FS.  Insulin protocol if needed.    MUSCULOSKELETAL: ambulate as tolerated    SDU             IMPRESSION:  Acute metabolic acidosis  KIM on CKD 4  HO Renal cancer s/p right nephrectomy and bladder resection  Left renal stone and cystic lesion with right urostomy    PLAN:    CNS: avoid sedation    HEENT: Oral care    PULMONARY:  HOB @ 45 degrees.  Aspiration precautions.  CXR ntoed.       CARDIOVASCULAR: avoid volume overload, MAP adequate.  Continue Bicarb drip.  Monitor PH     GI: GI prophylaxis.  Feeding.      RENAL:  Follow up lytes.  Correct as needed. On bicarb drip. VBG.  Renal eval noted.      INFECTIOUS DISEASE: Follow up cultures, procalcitonin    HEMATOLOGICAL:  DVT prophylaxis. DIMER    ENDOCRINE:  Follow up FS.  Insulin protocol if needed.    MUSCULOSKELETAL: ambulate as tolerated    SDU

## 2024-11-25 NOTE — PROGRESS NOTE ADULT - ATTENDING COMMENTS
appreciate renal eval  KIM on CKD4  Metabolic acidosis   on bicarb drip   pt has urologist and nephrologist   follows at [facility]  Anemia at baseline 2/2 CKD4  plan for d/c once electrolytes improved significantly

## 2024-11-26 LAB
ALBUMIN SERPL ELPH-MCNC: 3.5 G/DL — SIGNIFICANT CHANGE UP (ref 3.5–5.2)
ALP SERPL-CCNC: 57 U/L — SIGNIFICANT CHANGE UP (ref 30–115)
ALT FLD-CCNC: 9 U/L — SIGNIFICANT CHANGE UP (ref 0–41)
ANION GAP SERPL CALC-SCNC: 14 MMOL/L — SIGNIFICANT CHANGE UP (ref 7–14)
AST SERPL-CCNC: 8 U/L — SIGNIFICANT CHANGE UP (ref 0–41)
BASOPHILS # BLD AUTO: 0.03 K/UL — SIGNIFICANT CHANGE UP (ref 0–0.2)
BASOPHILS NFR BLD AUTO: 0.6 % — SIGNIFICANT CHANGE UP (ref 0–1)
BILIRUB SERPL-MCNC: 0.2 MG/DL — SIGNIFICANT CHANGE UP (ref 0.2–1.2)
BUN SERPL-MCNC: 102 MG/DL — CRITICAL HIGH (ref 10–20)
CALCIUM SERPL-MCNC: 7.3 MG/DL — LOW (ref 8.4–10.5)
CHLORIDE SERPL-SCNC: 112 MMOL/L — HIGH (ref 98–110)
CO2 SERPL-SCNC: 19 MMOL/L — SIGNIFICANT CHANGE UP (ref 17–32)
CREAT SERPL-MCNC: 5.6 MG/DL — CRITICAL HIGH (ref 0.7–1.5)
EGFR: 10 ML/MIN/1.73M2 — LOW
EOSINOPHIL # BLD AUTO: 0.28 K/UL — SIGNIFICANT CHANGE UP (ref 0–0.7)
EOSINOPHIL NFR BLD AUTO: 5.4 % — SIGNIFICANT CHANGE UP (ref 0–8)
GLUCOSE SERPL-MCNC: 101 MG/DL — HIGH (ref 70–99)
HCT VFR BLD CALC: 23.6 % — LOW (ref 42–52)
HGB BLD-MCNC: 7.7 G/DL — LOW (ref 14–18)
IMM GRANULOCYTES NFR BLD AUTO: 0.4 % — HIGH (ref 0.1–0.3)
LYMPHOCYTES # BLD AUTO: 1.24 K/UL — SIGNIFICANT CHANGE UP (ref 1.2–3.4)
LYMPHOCYTES # BLD AUTO: 23.7 % — SIGNIFICANT CHANGE UP (ref 20.5–51.1)
MCHC RBC-ENTMCNC: 27 PG — SIGNIFICANT CHANGE UP (ref 27–31)
MCHC RBC-ENTMCNC: 32.6 G/DL — SIGNIFICANT CHANGE UP (ref 32–37)
MCV RBC AUTO: 82.8 FL — SIGNIFICANT CHANGE UP (ref 80–94)
MONOCYTES # BLD AUTO: 0.49 K/UL — SIGNIFICANT CHANGE UP (ref 0.1–0.6)
MONOCYTES NFR BLD AUTO: 9.4 % — HIGH (ref 1.7–9.3)
NEUTROPHILS # BLD AUTO: 3.17 K/UL — SIGNIFICANT CHANGE UP (ref 1.4–6.5)
NEUTROPHILS NFR BLD AUTO: 60.5 % — SIGNIFICANT CHANGE UP (ref 42.2–75.2)
NRBC # BLD: 0 /100 WBCS — SIGNIFICANT CHANGE UP (ref 0–0)
PLATELET # BLD AUTO: 211 K/UL — SIGNIFICANT CHANGE UP (ref 130–400)
PMV BLD: 9.3 FL — SIGNIFICANT CHANGE UP (ref 7.4–10.4)
POTASSIUM SERPL-MCNC: 3.3 MMOL/L — LOW (ref 3.5–5)
POTASSIUM SERPL-SCNC: 3.3 MMOL/L — LOW (ref 3.5–5)
PROT SERPL-MCNC: 5.8 G/DL — LOW (ref 6–8)
RBC # BLD: 2.85 M/UL — LOW (ref 4.7–6.1)
RBC # FLD: 15.7 % — HIGH (ref 11.5–14.5)
SODIUM SERPL-SCNC: 145 MMOL/L — SIGNIFICANT CHANGE UP (ref 135–146)
WBC # BLD: 5.23 K/UL — SIGNIFICANT CHANGE UP (ref 4.8–10.8)
WBC # FLD AUTO: 5.23 K/UL — SIGNIFICANT CHANGE UP (ref 4.8–10.8)

## 2024-11-26 PROCEDURE — 99448 NTRPROF PH1/NTRNET/EHR 21-30: CPT

## 2024-11-26 PROCEDURE — 99232 SBSQ HOSP IP/OBS MODERATE 35: CPT

## 2024-11-26 RX ORDER — POTASSIUM CHLORIDE 600 MG/1
20 TABLET, EXTENDED RELEASE ORAL EVERY 4 HOURS
Refills: 0 | Status: DISCONTINUED | OUTPATIENT
Start: 2024-11-26 | End: 2024-11-26

## 2024-11-26 RX ORDER — ONDANSETRON HYDROCHLORIDE 4 MG/1
4 TABLET, FILM COATED ORAL ONCE
Refills: 0 | Status: COMPLETED | OUTPATIENT
Start: 2024-11-26 | End: 2024-11-26

## 2024-11-26 RX ORDER — POTASSIUM CHLORIDE 600 MG/1
20 TABLET, EXTENDED RELEASE ORAL
Refills: 0 | Status: COMPLETED | OUTPATIENT
Start: 2024-11-26 | End: 2024-11-26

## 2024-11-26 RX ORDER — 0.9 % SODIUM CHLORIDE 0.9 %
1000 INTRAVENOUS SOLUTION INTRAVENOUS
Refills: 0 | Status: DISCONTINUED | OUTPATIENT
Start: 2024-11-26 | End: 2024-11-27

## 2024-11-26 RX ADMIN — AMLODIPINE BESYLATE 10 MILLIGRAM(S): 10 TABLET ORAL at 05:38

## 2024-11-26 RX ADMIN — ONDANSETRON HYDROCHLORIDE 4 MILLIGRAM(S): 4 TABLET, FILM COATED ORAL at 08:57

## 2024-11-26 RX ADMIN — POTASSIUM CHLORIDE 20 MILLIEQUIVALENT(S): 600 TABLET, EXTENDED RELEASE ORAL at 14:05

## 2024-11-26 RX ADMIN — CALCIUM ACETATE 667 MILLIGRAM(S): 667 SOLUTION ORAL at 08:14

## 2024-11-26 RX ADMIN — SODIUM BICARBONATE 1300 MILLIGRAM(S): 84 INJECTION, SOLUTION INTRAVENOUS at 14:04

## 2024-11-26 RX ADMIN — PANTOPRAZOLE SODIUM 40 MILLIGRAM(S): 40 TABLET, DELAYED RELEASE ORAL at 06:43

## 2024-11-26 RX ADMIN — POTASSIUM CHLORIDE 20 MILLIEQUIVALENT(S): 600 TABLET, EXTENDED RELEASE ORAL at 20:48

## 2024-11-26 RX ADMIN — CALCIUM ACETATE 667 MILLIGRAM(S): 667 SOLUTION ORAL at 18:06

## 2024-11-26 RX ADMIN — POTASSIUM CHLORIDE 20 MILLIEQUIVALENT(S): 600 TABLET, EXTENDED RELEASE ORAL at 22:55

## 2024-11-26 RX ADMIN — Medication 2 TABLET(S): at 22:54

## 2024-11-26 RX ADMIN — Medication 5000 UNIT(S): at 18:05

## 2024-11-26 RX ADMIN — CALCIUM ACETATE 667 MILLIGRAM(S): 667 SOLUTION ORAL at 11:43

## 2024-11-26 RX ADMIN — SODIUM BICARBONATE 1300 MILLIGRAM(S): 84 INJECTION, SOLUTION INTRAVENOUS at 22:54

## 2024-11-26 RX ADMIN — POTASSIUM CHLORIDE 20 MILLIEQUIVALENT(S): 600 TABLET, EXTENDED RELEASE ORAL at 09:03

## 2024-11-26 RX ADMIN — Medication 5000 UNIT(S): at 05:38

## 2024-11-26 RX ADMIN — Medication 1 TABLET(S): at 11:43

## 2024-11-26 RX ADMIN — SODIUM BICARBONATE 1300 MILLIGRAM(S): 84 INJECTION, SOLUTION INTRAVENOUS at 05:38

## 2024-11-26 RX ADMIN — Medication 75 MILLILITER(S): at 20:48

## 2024-11-26 RX ADMIN — POTASSIUM CHLORIDE 20 MILLIEQUIVALENT(S): 600 TABLET, EXTENDED RELEASE ORAL at 18:06

## 2024-11-26 RX ADMIN — Medication 10 MILLIGRAM(S): at 11:43

## 2024-11-26 NOTE — DIETITIAN INITIAL EVALUATION ADULT - ORAL INTAKE PTA/DIET HISTORY
Patient reports poor appetite and PO intake PTA. He was feeling very tired. He was following a Renal diet. Patient was taking vitamin D for supplementation. No oral nutrition supplements taken. No chewing/swallowing difficulties. NKFA, no food intolerances reported.

## 2024-11-26 NOTE — DIETITIAN INITIAL EVALUATION ADULT - PERTINENT MEDS FT
MEDICATIONS  (STANDING):  amLODIPine   Tablet 10 milliGRAM(s) Oral daily  calcium acetate 667 milliGRAM(s) Oral three times a day with meals  calcium carbonate 1250 mG  + Vitamin D (OsCal 500 + D) 1 Tablet(s) Oral daily  chlorhexidine 2% Cloths 1 Application(s) Topical <User Schedule>  ezetimibe 10 milliGRAM(s) Oral daily  heparin   Injectable 5000 Unit(s) SubCutaneous every 12 hours  lactated ringers. 1000 milliLiter(s) (75 mL/Hr) IV Continuous <Continuous>  pantoprazole    Tablet 40 milliGRAM(s) Oral before breakfast  polyethylene glycol 3350 17 Gram(s) Oral every 12 hours  potassium chloride   Powder 20 milliEquivalent(s) Oral every 4 hours  senna 2 Tablet(s) Oral at bedtime  sodium bicarbonate 1300 milliGRAM(s) Oral every 8 hours    MEDICATIONS  (PRN):

## 2024-11-26 NOTE — PROGRESS NOTE ADULT - ATTENDING COMMENTS
IMPRESSION:    Acute metabolic acidosis resolving  KIM on CKD 4  HO Renal cancer s/p right nephrectomy and bladder resection  Left renal stone and cystic lesion with right urostomy    Plan as outlined above

## 2024-11-26 NOTE — PROGRESS NOTE ADULT - ASSESSMENT
IMPRESSION:  Acute metabolic acidosis resolving  KIM on CKD 4  HO Renal cancer s/p right nephrectomy and bladder resection  Left renal stone and cystic lesion with right urostomy    PLAN:    CNS: avoid sedation    HEENT: Oral care    PULMONARY:  HOB @ 45 degrees.  Aspiration precautions.  CXR noted       CARDIOVASCULAR: avoid volume overload, MAP adequate.     GI: GI prophylaxis.  Feeding.      RENAL:  Follow up lytes.  Correct as needed.  Renal eval noted.  DC Bicarb drip.     INFECTIOUS DISEASE: Follow up cultures, procalcitonin. Off abx    HEMATOLOGICAL:  DVT prophylaxis. DIMER noted    ENDOCRINE:  Follow up FS.  Insulin protocol if needed.    MUSCULOSKELETAL: ambulate as tolerated    DGTF             IMPRESSION:    Acute metabolic acidosis resolving  KIM on CKD 4  HO Renal cancer s/p right nephrectomy and bladder resection  Left renal stone and cystic lesion with right urostomy    PLAN:    CNS: avoid sedation    HEENT: Oral care    PULMONARY:  HOB @ 45 degrees.  Aspiration precautions.  CXR noted       CARDIOVASCULAR: avoid volume overload, MAP adequate.     GI: GI prophylaxis.  Feeding.      RENAL:  Follow up lytes.  Correct as needed.  Renal eval noted.  DC Bicarb drip.     INFECTIOUS DISEASE: Follow up cultures, procalcitonin. Off abx    HEMATOLOGICAL:  DVT prophylaxis. DIMER noted    ENDOCRINE:  Follow up FS.  Insulin protocol if needed.    MUSCULOSKELETAL: ambulate as tolerated    DGTF

## 2024-11-26 NOTE — DIETITIAN INITIAL EVALUATION ADULT - NS FNS DIET ORDER
Diet, Renal Restrictions:   For patients receiving Renal Replacement - No Protein Restr, No Conc K, No Conc Phos, Low Sodium  DASH/TLC {Sodium & Cholesterol Restricted} (DASH) (11-24-24 @ 15:11) [Active]

## 2024-11-26 NOTE — DIETITIAN INITIAL EVALUATION ADULT - OTHER CALCULATIONS
Energy: 1724 - 2155 kcal/day (20 - 25 kcal/kg)   Protein: 47 - 52 gm/day (0.55 - 0.6 gm/kg)  Fluid: 1 mL/kcal  estimated needs with consideration for age, acuity of illness, CKD stage 4  Fluid:

## 2024-11-26 NOTE — DIETITIAN INITIAL EVALUATION ADULT - PERSON TAUGHT/METHOD
verbal instruction/patient instructed discussed the importance of adequate kcal and protein intake; discussed benefits of oral nutrition supplement/verbal instruction/patient instructed

## 2024-11-26 NOTE — DIETITIAN INITIAL EVALUATION ADULT - COLLABORATION WITH OTHER PROVIDERS
Interventions: meals and snacks, medical food supplements, coordination of care  Monitoring/Evaluation: energy intake, weight, labs, skin status, NFPF

## 2024-11-26 NOTE — PROGRESS NOTE ADULT - SUBJECTIVE AND OBJECTIVE BOX
Patient is a 74y old  Male who presents with a chief complaint of nico on ckd (26 Nov 2024 09:06)      Overnight events: on RA. off pressor., no overnight events    Drips: on Bicarb at 100.             ROS: as in HPI; All other systems reviewed are negative        PHYSICAL EXAM  Vital Signs Last 24 Hrs  T(C): 36 (26 Nov 2024 08:00), Max: 36.6 (25 Nov 2024 11:00)  T(F): 96.8 (26 Nov 2024 08:00), Max: 97.8 (25 Nov 2024 11:00)  HR: 77 (26 Nov 2024 09:00) (74 - 87)  BP: 138/64 (26 Nov 2024 09:00) (129/67 - 163/78)  BP(mean): 92 (26 Nov 2024 09:00) (92 - 112)  RR: 20 (26 Nov 2024 08:00) (18 - 20)  SpO2: 95% (26 Nov 2024 08:53) (95% - 98%)    Parameters below as of 26 Nov 2024 08:53  Patient On (Oxygen Delivery Method): room air          CONSTITUTIONAL:   NAD    ENT:   Airway patent,     EYES:   Clear bilaterally,   pupils equal,   round and reactive to light.    CARDIAC:   Normal rate,   regular rhythm.    no edema    RESPIRATORY:   No wheezing   Normal chest expansion  Not tachypneic,    GASTROINTESTINAL:  Abdomen soft, non-tender,   No guarding,   Positive BS  right urostomy    MUSCULOSKELETAL:   Range of motion is not limited,    NEUROLOGICAL:   Alert and oriented   No motor deficits.    SKIN:   Skin normal color for race,   No evidence of rash.                I&O's Detail    25 Nov 2024 07:01  -  26 Nov 2024 07:00  --------------------------------------------------------  IN:    Sodium Bicarbonate: 1200 mL  Total IN: 1200 mL    OUT:    Urostomy (mL): 1401 mL    Voided (mL): 3 mL  Total OUT: 1404 mL    Total NET: -204 mL            LABS:                        7.7    5.23  )-----------( 211      ( 26 Nov 2024 05:03 )             23.6     26 Nov 2024 05:03    145    |  112    |  102    ----------------------------<  101    3.3     |  19     |  5.6      Ca    7.3        26 Nov 2024 05:03  Phos  5.2       25 Nov 2024 04:55  Mg     1.8       25 Nov 2024 04:55    TPro  5.8    /  Alb  3.5    /  TBili  0.2    /  DBili  x      /  AST  8      /  ALT  9      /  AlkPhos  57     26 Nov 2024 05:03  Amylase x     lipase x              CAPILLARY BLOOD GLUCOSE        PT/INR - ( 25 Nov 2024 04:55 )   PT: 11.10 sec;   INR: 0.94 ratio         PTT - ( 25 Nov 2024 04:55 )  PTT:27.7 sec  Urinalysis Basic - ( 26 Nov 2024 05:03 )    Color: x / Appearance: x / SG: x / pH: x  Gluc: 101 mg/dL / Ketone: x  / Bili: x / Urobili: x   Blood: x / Protein: x / Nitrite: x   Leuk Esterase: x / RBC: x / WBC x   Sq Epi: x / Non Sq Epi: x / Bacteria: x      Culture        MEDICATIONS  (STANDING):  amLODIPine   Tablet 10 milliGRAM(s) Oral daily  calcium acetate 667 milliGRAM(s) Oral three times a day with meals  calcium carbonate 1250 mG  + Vitamin D (OsCal 500 + D) 1 Tablet(s) Oral daily  chlorhexidine 2% Cloths 1 Application(s) Topical <User Schedule>  ezetimibe 10 milliGRAM(s) Oral daily  heparin   Injectable 5000 Unit(s) SubCutaneous every 12 hours  pantoprazole    Tablet 40 milliGRAM(s) Oral before breakfast  polyethylene glycol 3350 17 Gram(s) Oral every 12 hours  potassium chloride   Powder 20 milliEquivalent(s) Oral every 4 hours  senna 2 Tablet(s) Oral at bedtime  sodium bicarbonate 1300 milliGRAM(s) Oral every 8 hours  sodium bicarbonate  Infusion 0.174 mEq/kG/Hr (100 mL/Hr) IV Continuous <Continuous>    MEDICATIONS  (PRN):                 Patient is a 74y old  Male who presents with a chief complaint of nico on ckd (26 Nov 2024 09:06)      Overnight events: on RA.  Off pressor., no overnight events    Drips: on Bicarb at 100.             ROS: as in HPI; All other systems reviewed are negative        PHYSICAL EXAM  Vital Signs Last 24 Hrs  T(C): 36 (26 Nov 2024 08:00), Max: 36.6 (25 Nov 2024 11:00)  T(F): 96.8 (26 Nov 2024 08:00), Max: 97.8 (25 Nov 2024 11:00)  HR: 77 (26 Nov 2024 09:00) (74 - 87)  BP: 138/64 (26 Nov 2024 09:00) (129/67 - 163/78)  BP(mean): 92 (26 Nov 2024 09:00) (92 - 112)  RR: 20 (26 Nov 2024 08:00) (18 - 20)  SpO2: 95% (26 Nov 2024 08:53) (95% - 98%)    Parameters below as of 26 Nov 2024 08:53  Patient On (Oxygen Delivery Method): room air          CONSTITUTIONAL:   NAD    ENT:   Airway patent,     EYES:   Clear bilaterally,   pupils equal,   round and reactive to light.    CARDIAC:   Normal rate,   regular rhythm.    no edema    RESPIRATORY:   No wheezing   Normal chest expansion  Not tachypneic,    GASTROINTESTINAL:  Abdomen soft, non-tender,   No guarding,   Positive BS  right urostomy    MUSCULOSKELETAL:   Range of motion is not limited,    NEUROLOGICAL:   Alert and oriented   No motor deficits.    SKIN:   Skin normal color for race,   No evidence of rash.                I&O's Detail    25 Nov 2024 07:01  -  26 Nov 2024 07:00  --------------------------------------------------------  IN:    Sodium Bicarbonate: 1200 mL  Total IN: 1200 mL    OUT:    Urostomy (mL): 1401 mL    Voided (mL): 3 mL  Total OUT: 1404 mL    Total NET: -204 mL            LABS:                        7.7    5.23  )-----------( 211      ( 26 Nov 2024 05:03 )             23.6     26 Nov 2024 05:03    145    |  112    |  102    ----------------------------<  101    3.3     |  19     |  5.6      Ca    7.3        26 Nov 2024 05:03  Phos  5.2       25 Nov 2024 04:55  Mg     1.8       25 Nov 2024 04:55    TPro  5.8    /  Alb  3.5    /  TBili  0.2    /  DBili  x      /  AST  8      /  ALT  9      /  AlkPhos  57     26 Nov 2024 05:03  Amylase x     lipase x              CAPILLARY BLOOD GLUCOSE        PT/INR - ( 25 Nov 2024 04:55 )   PT: 11.10 sec;   INR: 0.94 ratio         PTT - ( 25 Nov 2024 04:55 )  PTT:27.7 sec  Urinalysis Basic - ( 26 Nov 2024 05:03 )    Color: x / Appearance: x / SG: x / pH: x  Gluc: 101 mg/dL / Ketone: x  / Bili: x / Urobili: x   Blood: x / Protein: x / Nitrite: x   Leuk Esterase: x / RBC: x / WBC x   Sq Epi: x / Non Sq Epi: x / Bacteria: x      Culture        MEDICATIONS  (STANDING):  amLODIPine   Tablet 10 milliGRAM(s) Oral daily  calcium acetate 667 milliGRAM(s) Oral three times a day with meals  calcium carbonate 1250 mG  + Vitamin D (OsCal 500 + D) 1 Tablet(s) Oral daily  chlorhexidine 2% Cloths 1 Application(s) Topical <User Schedule>  ezetimibe 10 milliGRAM(s) Oral daily  heparin   Injectable 5000 Unit(s) SubCutaneous every 12 hours  pantoprazole    Tablet 40 milliGRAM(s) Oral before breakfast  polyethylene glycol 3350 17 Gram(s) Oral every 12 hours  potassium chloride   Powder 20 milliEquivalent(s) Oral every 4 hours  senna 2 Tablet(s) Oral at bedtime  sodium bicarbonate 1300 milliGRAM(s) Oral every 8 hours  sodium bicarbonate  Infusion 0.174 mEq/kG/Hr (100 mL/Hr) IV Continuous <Continuous>    MEDICATIONS  (PRN):

## 2024-11-26 NOTE — PROGRESS NOTE ADULT - ASSESSMENT
74-year-old man past medical history significant for hypertension, CKD 4 , dyslipidemia, urothelial carcinoma status post right nephrectomy with urostomy, colon cancer, (colon resection), prostate cancer, reports his last immunotherapy treatment was 1.5 years ago presenting for worsening kidney function  # KIM on CKD 4   # Acidosis high anion gap   # RCC sp right nephrectomy   # anemia chronic   continue bicarbonate drip if repeated bicarbonate < 18 , if > 18 switch to LR at 75 cc/h    continue sodium bicarbonate 1300 po q 8   cr stable / check repeat today    non oliguric   bp controlled    CT  No evidence of urinary tract obstruction.  2. Multiple nonobstructing left renal calculi measure up to 2.0 cm within   left renal lower pole (approximately 700 Hounsfield units.)  3. Status post right nephrectomy, cystoprostatectomy and right lower   quadrant urostomy; postoperative changes are noted along the left renal   upper pole, with a 4.9 cm indeterminate left renal upper pole cystic   structure, possibly related to prior procedure.  4. Contracted gallbladder with cholelithiasis.  ph at goal   IR consult for vein mapping   no acute indication for RRT   will follow

## 2024-11-26 NOTE — DIETITIAN INITIAL EVALUATION ADULT - OTHER INFO
Patient is a 74-year-old man past medical history significant for hypertension, dyslipidemia, CKD 4, nephrolithiasis, urothelial carcinoma status post nephrectomy and bladder removal, with ileal conduit urinary diversion urostomy, colon cancer?, (patient says he has colon resection but not clear if because colon cancer or for ileal conduit), prostate cancer, reports his last immunotherapy treatment was 1.5 years ago after he had a neck lymph node biopsy, presents to the ED after he was told by his nephrologist to present to the ED for possible dialysis.     KIM on CKD VS CKD progression; HAGMA - need to rule out obstruction given hx of RCC and nephrolithiasis  urothelial  carcinoma s/p nephrectomy and bladder removal with ileal conduit urinary diversion urostomy; colon cancer? (patient says he has colon resection but not clear if because colon cancer or for ileal conduit); prostate cancer; Anemia;    Patient reports  lbs (90 kg) x 1 month - endorses weight loss  vs current dosing weight 86.2 kg   indicating 4.4%  weight loss x 1 month - which is not significant for malnutrition     No height - reports height as 69 inches.   Patient was able to have 1 boiled egg, 50% Greek toast and yogurt this morning at breakfast meal

## 2024-11-26 NOTE — DIETITIAN INITIAL EVALUATION ADULT - PERTINENT LABORATORY DATA
11-26    145  |  112[H]  |  102[HH]  ----------------------------<  101[H]  3.3[L]   |  19  |  5.6[HH]    Ca    7.3[L]      26 Nov 2024 05:03  Phos  5.2     11-25  Mg     1.8     11-25    TPro  5.8[L]  /  Alb  3.5  /  TBili  0.2  /  DBili  x   /  AST  8   /  ALT  9   /  AlkPhos  57  11-26

## 2024-11-26 NOTE — CHART NOTE - NSCHARTNOTEFT_GEN_A_CORE
SICU Transfer Note    Transfer from: SDU  Transfer to: MED/SURG  Accepting physican:      74-year-old man past medical history significant for hypertension, dyslipidemia, CKD 4, nephrolithiasis, urothelial carcinoma status post nephrectomy and bladder removal, with ileal conduit urinary diversion urostomy, colon cancer?, (patient says he has colon resection but not clear if because colon cancer or for ileal conduit), prostate cancer, reports his last immunotherapy treatment was 1.5 years ago after he had a neck lymph node biopsy, presents to the ED after he was told by his nephrologist to present to the ED for possible dialysis.  Patient complaining of 1 week of anorexia, nausea (no vomiting), dry mouth, intermittent hematuria and decreased urine output. He also endorses constipation and decreased Apetite. No fever, chills.  No cough or URI symptoms.  No abdominal pain or back pain or any other symptoms.        ED course:  - vitals: VS besides /71, satting 96% on RA.  - labs: hb 9.5 (baseline 8-9), Cr 6.4 (baseline around 4), , AG 17, corrected Ca 8, PO4 5.2, vbg PH 7.08, CO2 30, HCO3 9.  - CXR: wnl.     Admitted for HAGMA 2/2 renal failure.     SDU course:    Patient was seen by nephro he was started on bicarb drip given low bicarb in setting of worsening kim vs progression ckd, started on oral bicarb, patient bicarb improved on 11/26, stopped the drip. IR consulted for vein mapping. As per nephro no current acute indication for RRT, he was started on LR 75cc/hr instead of bicarb drip. Stable stay and stable for downgrade to floor         ASSESMENT AND PLAN:     #KIM on CKD VS CKD progression   #HAGMA   - improving Cr 6.4 on presentation (baseline around 4), , AG 17, corrected Ca 8, PO4 5.2, vbg PH 7.08, CO2 30, HCO3 9.  - urine output: 1000  PLAN  - nephrology: check renal bladder US,  Post right nephrectomy and cystectomy.  US kidney Multiple left renal stones better seen on same day CT. No hydronephrosis. 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      #Anemia  - labs: hb 8 from  9.5 (baseline 8-9), MCV 86  - most likely anemia of chronic disease 2/2 ESRD  PLAN  - follow up anemia work up and trend cbc, active t and s     #DLD  #HTN  - c/w home meds    #Constipation  - abd sof lax, distended   - bowel regimen       Full code  DVT proph heparin  GI proph PPI  diet renal     For Follow-Up:    Nephro follow up anticipated for 24 hours if electrolytes stable       Vital Signs Last 24 Hrs  T(C): 35.8 (26 Nov 2024 12:00), Max: 36.4 (25 Nov 2024 16:26)  T(F): 96.5 (26 Nov 2024 12:00), Max: 97.6 (25 Nov 2024 16:26)  HR: 79 (26 Nov 2024 12:00) (74 - 87)  BP: 137/67 (26 Nov 2024 12:00) (129/67 - 163/78)  BP(mean): 92 (26 Nov 2024 09:00) (92 - 112)  RR: 18 (26 Nov 2024 12:00) (18 - 20)  SpO2: 96% (26 Nov 2024 12:00) (95% - 98%)    Parameters below as of 26 Nov 2024 12:00  Patient On (Oxygen Delivery Method): room air      I&O's Summary    25 Nov 2024 07:01  -  26 Nov 2024 07:00  --------------------------------------------------------  IN: 1200 mL / OUT: 1404 mL / NET: -204 mL          MEDICATIONS  (STANDING):  amLODIPine   Tablet 10 milliGRAM(s) Oral daily  calcium acetate 667 milliGRAM(s) Oral three times a day with meals  calcium carbonate 1250 mG  + Vitamin D (OsCal 500 + D) 1 Tablet(s) Oral daily  chlorhexidine 2% Cloths 1 Application(s) Topical <User Schedule>  ezetimibe 10 milliGRAM(s) Oral daily  heparin   Injectable 5000 Unit(s) SubCutaneous every 12 hours  lactated ringers. 1000 milliLiter(s) (75 mL/Hr) IV Continuous <Continuous>  pantoprazole    Tablet 40 milliGRAM(s) Oral before breakfast  polyethylene glycol 3350 17 Gram(s) Oral every 12 hours  potassium chloride   Powder 20 milliEquivalent(s) Oral every 4 hours  senna 2 Tablet(s) Oral at bedtime  sodium bicarbonate 1300 milliGRAM(s) Oral every 8 hours    MEDICATIONS  (PRN):        LABS                                            7.7                   Neurophils% (auto):   60.5   (11-26 @ 05:03):    5.23 )-----------(211          Lymphocytes% (auto):  23.7                                          23.6                   Eosinphils% (auto):   5.4      Manual%: Neutrophils x    ; Lymphocytes x    ; Eosinophils x    ; Bands%: x    ; Blasts x                                    145    |  112    |  102                 Calcium: 7.3   / iCa: x      (11-26 @ 05:03)    ----------------------------<  101       Magnesium: x                                3.3     |  19     |  5.6              Phosphorous: x        TPro  5.8    /  Alb  3.5    /  TBili  0.2    /  DBili  x      /  AST  8      /  ALT  9      /  AlkPhos  57     26 Nov 2024 05:03 Transfer from: SDU  Transfer to: MED/SURG  Accepting physican:      74-year-old man past medical history significant for hypertension, dyslipidemia, CKD 4, nephrolithiasis, urothelial carcinoma status post nephrectomy and bladder removal, with ileal conduit urinary diversion urostomy, colon cancer?, (patient says he has colon resection but not clear if because colon cancer or for ileal conduit), prostate cancer, reports his last immunotherapy treatment was 1.5 years ago after he had a neck lymph node biopsy, presents to the ED after he was told by his nephrologist to present to the ED for possible dialysis.  Patient complaining of 1 week of anorexia, nausea (no vomiting), dry mouth, intermittent hematuria and decreased urine output. He also endorses constipation and decreased Apetite. No fever, chills.  No cough or URI symptoms.  No abdominal pain or back pain or any other symptoms.        ED course:  - vitals: VS besides /71, satting 96% on RA.  - labs: hb 9.5 (baseline 8-9), Cr 6.4 (baseline around 4), , AG 17, corrected Ca 8, PO4 5.2, vbg PH 7.08, CO2 30, HCO3 9.  - CXR: wnl.     Admitted for HAGMA 2/2 renal failure.     SDU course:    Patient was seen by nephro he was started on bicarb drip given low bicarb in setting of worsening kim vs progression ckd, started on oral bicarb, patient bicarb improved on 11/26, stopped the drip. IR consulted for vein mapping. As per nephro no current acute indication for RRT, he was started on LR 75cc/hr instead of bicarb drip. Stable stay and stable for downgrade to floor         ASSESMENT AND PLAN:     #KIM on CKD VS CKD progression   #HAGMA   - improving Cr 6.4 on presentation (baseline around 4), , AG 17, corrected Ca 8, PO4 5.2, vbg PH 7.08, CO2 30, HCO3 9.  - urine output: 1000  PLAN  - nephrology: check renal bladder US,  Post right nephrectomy and cystectomy.  US kidney Multiple left renal stones better seen on same day CT. No hydronephrosis. 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      #Anemia  - labs: hb 8 from  9.5 (baseline 8-9), MCV 86  - most likely anemia of chronic disease 2/2 ESRD  PLAN  - follow up anemia work up and trend cbc, active t and s     #DLD  #HTN  - c/w home meds    #Constipation  - abd sof lax, distended   - bowel regimen       Full code  DVT proph heparin  GI proph PPI  diet renal     For Follow-Up:    Nephro follow up anticipated for 24 hours if electrolytes stable       Vital Signs Last 24 Hrs  T(C): 35.8 (26 Nov 2024 12:00), Max: 36.4 (25 Nov 2024 16:26)  T(F): 96.5 (26 Nov 2024 12:00), Max: 97.6 (25 Nov 2024 16:26)  HR: 79 (26 Nov 2024 12:00) (74 - 87)  BP: 137/67 (26 Nov 2024 12:00) (129/67 - 163/78)  BP(mean): 92 (26 Nov 2024 09:00) (92 - 112)  RR: 18 (26 Nov 2024 12:00) (18 - 20)  SpO2: 96% (26 Nov 2024 12:00) (95% - 98%)    Parameters below as of 26 Nov 2024 12:00  Patient On (Oxygen Delivery Method): room air      I&O's Summary    25 Nov 2024 07:01  -  26 Nov 2024 07:00  --------------------------------------------------------  IN: 1200 mL / OUT: 1404 mL / NET: -204 mL          MEDICATIONS  (STANDING):  amLODIPine   Tablet 10 milliGRAM(s) Oral daily  calcium acetate 667 milliGRAM(s) Oral three times a day with meals  calcium carbonate 1250 mG  + Vitamin D (OsCal 500 + D) 1 Tablet(s) Oral daily  chlorhexidine 2% Cloths 1 Application(s) Topical <User Schedule>  ezetimibe 10 milliGRAM(s) Oral daily  heparin   Injectable 5000 Unit(s) SubCutaneous every 12 hours  lactated ringers. 1000 milliLiter(s) (75 mL/Hr) IV Continuous <Continuous>  pantoprazole    Tablet 40 milliGRAM(s) Oral before breakfast  polyethylene glycol 3350 17 Gram(s) Oral every 12 hours  potassium chloride   Powder 20 milliEquivalent(s) Oral every 4 hours  senna 2 Tablet(s) Oral at bedtime  sodium bicarbonate 1300 milliGRAM(s) Oral every 8 hours    MEDICATIONS  (PRN):        LABS                                            7.7                   Neurophils% (auto):   60.5   (11-26 @ 05:03):    5.23 )-----------(211          Lymphocytes% (auto):  23.7                                          23.6                   Eosinphils% (auto):   5.4      Manual%: Neutrophils x    ; Lymphocytes x    ; Eosinophils x    ; Bands%: x    ; Blasts x                                    145    |  112    |  102                 Calcium: 7.3   / iCa: x      (11-26 @ 05:03)    ----------------------------<  101       Magnesium: x                                3.3     |  19     |  5.6              Phosphorous: x        TPro  5.8    /  Alb  3.5    /  TBili  0.2    /  DBili  x      /  AST  8      /  ALT  9      /  AlkPhos  57     26 Nov 2024 05:03 Transfer from: SDU  Transfer to: MED/SURG  Accepting physican:      74-year-old man past medical history significant for hypertension, dyslipidemia, CKD 4, nephrolithiasis, urothelial carcinoma status post nephrectomy and bladder removal, with ileal conduit urinary diversion urostomy, colon cancer?, (patient says he has colon resection but not clear if because colon cancer or for ileal conduit), prostate cancer, reports his last immunotherapy treatment was 1.5 years ago after he had a neck lymph node biopsy, presents to the ED after he was told by his nephrologist to present to the ED for possible dialysis.  Patient complaining of 1 week of anorexia, nausea (no vomiting), dry mouth, intermittent hematuria and decreased urine output. He also endorses constipation and decreased Apetite. No fever, chills.  No cough or URI symptoms.  No abdominal pain or back pain or any other symptoms.        ED course:  - vitals: VS besides /71, satting 96% on RA.  - labs: hb 9.5 (baseline 8-9), Cr 6.4 (baseline around 4), , AG 17, corrected Ca 8, PO4 5.2, vbg PH 7.08, CO2 30, HCO3 9.  - CXR: wnl.     Admitted for HAGMA 2/2 renal failure.     SDU course:    Patient was seen by nephro he was started on bicarb drip given low bicarb in setting of worsening kim vs progression ckd, started on oral bicarb, patient bicarb improved on 11/26, stopped the drip. IR consulted for vein mapping. As per nephro no current acute indication for RRT, he was started on LR 75cc/hr instead of bicarb drip. Stable stay and stable for downgrade to floor         ASSESMENT AND PLAN:     #KIM on CKD VS CKD progression   #HAGMA   - improving Cr 6.4 on presentation (baseline around 4), , AG 17, corrected Ca 8, PO4 5.2, vbg PH 7.08, CO2 30, HCO3 9.  - urine output: 1000  PLAN  - nephrology: check renal bladder US,  Post right nephrectomy and cystectomy.  US kidney Multiple left renal stones better seen on same day CT. No hydronephrosis. 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      #Anemia  - labs: hb 8 from  9.5 (baseline 8-9), MCV 86  - most likely anemia of chronic disease 2/2 ESRD  PLAN  - follow up anemia work up and trend cbc, active t and s     #DLD  #HTN  - c/w home meds    #Constipation  - abd sof lax, distended   - bowel regimen       Full code  DVT proph heparin  GI proph PPI  diet renal     For Follow-Up:    Nephro follow up anticipated for 24 hours if electrolytes stable       Vital Signs Last 24 Hrs  T(C): 35.8 (26 Nov 2024 12:00), Max: 36.4 (25 Nov 2024 16:26)  T(F): 96.5 (26 Nov 2024 12:00), Max: 97.6 (25 Nov 2024 16:26)  HR: 79 (26 Nov 2024 12:00) (74 - 87)  BP: 137/67 (26 Nov 2024 12:00) (129/67 - 163/78)  BP(mean): 92 (26 Nov 2024 09:00) (92 - 112)  RR: 18 (26 Nov 2024 12:00) (18 - 20)  SpO2: 96% (26 Nov 2024 12:00) (95% - 98%)    Parameters below as of 26 Nov 2024 12:00  Patient On (Oxygen Delivery Method): room air      I&O's Summary    25 Nov 2024 07:01  -  26 Nov 2024 07:00  --------------------------------------------------------  IN: 1200 mL / OUT: 1404 mL / NET: -204 mL          MEDICATIONS  (STANDING):  amLODIPine   Tablet 10 milliGRAM(s) Oral daily  calcium acetate 667 milliGRAM(s) Oral three times a day with meals  calcium carbonate 1250 mG  + Vitamin D (OsCal 500 + D) 1 Tablet(s) Oral daily  chlorhexidine 2% Cloths 1 Application(s) Topical <User Schedule>  ezetimibe 10 milliGRAM(s) Oral daily  heparin   Injectable 5000 Unit(s) SubCutaneous every 12 hours  lactated ringers. 1000 milliLiter(s) (75 mL/Hr) IV Continuous <Continuous>  pantoprazole    Tablet 40 milliGRAM(s) Oral before breakfast  polyethylene glycol 3350 17 Gram(s) Oral every 12 hours  potassium chloride   Powder 20 milliEquivalent(s) Oral every 4 hours  senna 2 Tablet(s) Oral at bedtime  sodium bicarbonate 1300 milliGRAM(s) Oral every 8 hours    MEDICATIONS  (PRN):        LABS                                            7.7                   Neurophils% (auto):   60.5   (11-26 @ 05:03):    5.23 )-----------(211          Lymphocytes% (auto):  23.7                                          23.6                   Eosinphils% (auto):   5.4      Manual%: Neutrophils x    ; Lymphocytes x    ; Eosinophils x    ; Bands%: x    ; Blasts x                                    145    |  112    |  102                 Calcium: 7.3   / iCa: x      (11-26 @ 05:03)    ----------------------------<  101       Magnesium: x                                3.3     |  19     |  5.6              Phosphorous: x        TPro  5.8    /  Alb  3.5    /  TBili  0.2    /  DBili  x      /  AST  8      /  ALT  9      /  AlkPhos  57     26 Nov 2024 05:03

## 2024-11-26 NOTE — PROGRESS NOTE ADULT - SUBJECTIVE AND OBJECTIVE BOX
seen and examined   24 h events noted   no distress       PAST HISTORY  --------------------------------------------------------------------------------  No significant changes to PMH, PSH, FHx, SHx, unless otherwise noted    ALLERGIES & MEDICATIONS  --------------------------------------------------------------------------------  Allergies    No Known Allergies    Intolerances      Standing Inpatient Medications  amLODIPine   Tablet 10 milliGRAM(s) Oral daily  calcium acetate 667 milliGRAM(s) Oral three times a day with meals  calcium carbonate 1250 mG  + Vitamin D (OsCal 500 + D) 1 Tablet(s) Oral daily  chlorhexidine 2% Cloths 1 Application(s) Topical <User Schedule>  ezetimibe 10 milliGRAM(s) Oral daily  heparin   Injectable 5000 Unit(s) SubCutaneous every 12 hours  pantoprazole    Tablet 40 milliGRAM(s) Oral before breakfast  polyethylene glycol 3350 17 Gram(s) Oral every 12 hours  senna 2 Tablet(s) Oral at bedtime  sodium bicarbonate 1300 milliGRAM(s) Oral every 8 hours  sodium bicarbonate  Infusion 0.174 mEq/kG/Hr IV Continuous <Continuous>    PRN Inpatient Medications        VITALS/PHYSICAL EXAM  --------------------------------------------------------------------------------  T(C): 35.6 (11-26-24 @ 04:00), Max: 36.6 (11-25-24 @ 11:00)  HR: 74 (11-26-24 @ 04:00) (74 - 87)  BP: 138/73 (11-26-24 @ 04:00) (129/67 - 144/78)  RR: 18 (11-26-24 @ 04:00) (18 - 20)  SpO2: 96% (11-26-24 @ 04:00) (96% - 98%)  Wt(kg): --    Weight (kg): 86.2 (11-24-24 @ 08:48)      11-24-24 @ 07:01  -  11-25-24 @ 07:00  --------------------------------------------------------  IN: 1850 mL / OUT: 2000 mL / NET: -150 mL    11-25-24 @ 07:01  -  11-26-24 @ 06:43  --------------------------------------------------------  IN: 1100 mL / OUT: 1404 mL / NET: -304 mL      Physical Exam:  	Gen: NAD  	Pulm: CTA B/L  	CV:  S1S2; no rub  	Abd: +urostomy   	LE:  no edema      LABS/STUDIES  --------------------------------------------------------------------------------              8.0    4.75  >-----------<  207      [11-25-24 @ 04:55]              25.2     144  |  116  |  111  ----------------------------<  101      [11-25-24 @ 04:55]  3.7   |  15  |  6.1        Ca     8.1     [11-25-24 @ 04:55]      Mg     1.8     [11-25-24 @ 04:55]      Phos  5.2     [11-25-24 @ 04:55]    TPro  6.0  /  Alb  3.8  /  TBili  0.2  /  DBili  x   /  AST  8   /  ALT  11  /  AlkPhos  61  [11-25-24 @ 04:55]    PT/INR: PT 11.10, INR 0.94       [11-25-24 @ 04:55]  PTT: 27.7       [11-25-24 @ 04:55]      Creatinine Trend:  SCr 6.1 [11-25 @ 04:55]  SCr 6.0 [11-24 @ 22:08]  SCr 6.4 [11-24 @ 09:51]    Urinalysis - [11-25-24 @ 04:55]      Color  / Appearance  / SG  / pH       Gluc 101 / Ketone   / Bili  / Urobili        Blood  / Protein  / Leuk Est  / Nitrite       RBC  / WBC  / Hyaline  / Gran  / Sq Epi  / Non Sq Epi  / Bacteria       Iron 58, TIBC 188, %sat 31      [11-25-24 @ 04:55]  Ferritin 166      [11-25-24 @ 04:55]  Vitamin D (25OH) 15      [11-25-24 @ 04:55]

## 2024-11-26 NOTE — DIETITIAN INITIAL EVALUATION ADULT - LAB (SPECIFY)
11/25:  H/H 7.7 / 23.6 (L), K+ 3.3 (L), Chloride 112 (H),  (HH), Cr 5.6 (HH), Glucose 101 (H), Ca 7.3 (L), eGFR 10 (L)

## 2024-11-26 NOTE — CONSULT NOTE ADULT - SUBJECTIVE AND OBJECTIVE BOX
INTERVENTIONAL RADIOLOGY CONSULT:     Procedure Requested: vein mapping    HPI:  74-year-old man past medical history significant for hypertension, dyslipidemia, CKD 4, nephrolithiasis, urothelial carcinoma status post nephrectomy and bladder removal, with ileal conduit urinary diversion urostomy, colon cancer?, (patient says he has colon resection but not clear if because colon cancer or for ileal conduit), prostate cancer, reports his last immunotherapy treatment was 1.5 years ago after he had a neck lymph node biopsy, presents to the ED after he was told by his nephrologist to present to the ED for possible dialysis.  Patient complaining of 1 week of anorexia, nausea (no vomiting), dry mouth, intermittent hematuria and decreased urine output. He also endorses constipation and decreased Apetite. No fever, chills.  No cough or URI symptoms.  No abdominal pain or back pain or any other symptoms.        ED course:  - vitals: VS besides /71, satting 96% on RA.  - labs: hb 9.5 (baseline 8-9), Cr 6.4 (baseline around 4), , AG 17, corrected Ca 8, PO4 5.2, vbg PH 7.08, CO2 30, HCO3 9.  - CXR: wnl.     Admitted for HAGMA 2/2 renal failure.  (24 Nov 2024 14:29)      PAST MEDICAL & SURGICAL HISTORY:  HLD (hyperlipidemia)      HTN (hypertension)      History of bladder surgery      Prostate cancer      Ureter cancer, right      Colon cancer      H/O right nephrectomy      History of colon resection      History of bladder surgery      History of kidney surgery  R urostomy      H/O cataract extraction  R iol          MEDICATIONS  (STANDING):  amLODIPine   Tablet 10 milliGRAM(s) Oral daily  calcium acetate 667 milliGRAM(s) Oral three times a day with meals  calcium carbonate 1250 mG  + Vitamin D (OsCal 500 + D) 1 Tablet(s) Oral daily  chlorhexidine 2% Cloths 1 Application(s) Topical <User Schedule>  ezetimibe 10 milliGRAM(s) Oral daily  heparin   Injectable 5000 Unit(s) SubCutaneous every 12 hours  pantoprazole    Tablet 40 milliGRAM(s) Oral before breakfast  polyethylene glycol 3350 17 Gram(s) Oral every 12 hours  potassium chloride   Powder 20 milliEquivalent(s) Oral every 4 hours  senna 2 Tablet(s) Oral at bedtime  sodium bicarbonate 1300 milliGRAM(s) Oral every 8 hours  sodium bicarbonate  Infusion 0.174 mEq/kG/Hr (100 mL/Hr) IV Continuous <Continuous>    MEDICATIONS  (PRN):      Allergies    No Known Allergies    Intolerances      Physical Exam:   Vital Signs Last 24 Hrs  T(C): 36 (26 Nov 2024 08:00), Max: 36.6 (25 Nov 2024 11:00)  T(F): 96.8 (26 Nov 2024 08:00), Max: 97.8 (25 Nov 2024 11:00)  HR: 80 (26 Nov 2024 08:00) (74 - 87)  BP: 163/78 (26 Nov 2024 08:00) (129/67 - 163/78)  BP(mean): 112 (26 Nov 2024 08:00) (92 - 112)  RR: 20 (26 Nov 2024 08:00) (18 - 20)  SpO2: 95% (26 Nov 2024 08:53) (95% - 98%)    Parameters below as of 26 Nov 2024 08:53  Patient On (Oxygen Delivery Method): room air    Labs:                         7.7    5.23  )-----------( 211      ( 26 Nov 2024 05:03 )             23.6     11-26    145  |  112[H]  |  102[HH]  ----------------------------<  101[H]  3.3[L]   |  19  |  5.6[HH]    Ca    7.3[L]      26 Nov 2024 05:03  Phos  5.2     11-25  Mg     1.8     11-25    TPro  5.8[L]  /  Alb  3.5  /  TBili  0.2  /  DBili  x   /  AST  8   /  ALT  9   /  AlkPhos  57  11-26    PT/INR - ( 25 Nov 2024 04:55 )   PT: 11.10 sec;   INR: 0.94 ratio         PTT - ( 25 Nov 2024 04:55 )  PTT:27.7 sec    Pertinent labs:                      7.7    5.23  )-----------( 211      ( 26 Nov 2024 05:03 )             23.6       11-26    145  |  112[H]  |  102[HH]  ----------------------------<  101[H]  3.3[L]   |  19  |  5.6[HH]    Ca    7.3[L]      26 Nov 2024 05:03  Phos  5.2     11-25  Mg     1.8     11-25    TPro  5.8[L]  /  Alb  3.5  /  TBili  0.2  /  DBili  x   /  AST  8   /  ALT  9   /  AlkPhos  57  11-26      PT/INR - ( 25 Nov 2024 04:55 )   PT: 11.10 sec;   INR: 0.94 ratio         PTT - ( 25 Nov 2024 04:55 )  PTT:27.7 sec    Radiology & Additional Studies:   Radiology imaging reviewed.       ASSESSMENT/ PLAN:   74-year-old man past medical history significant for hypertension, CKD 4 , dyslipidemia, urothelial carcinoma status post right nephrectomy with urostomy, colon cancer, (colon resection), prostate cancer, reports his last immunotherapy treatment was 1.5 years ago presenting for worsening kidney function. Nephrostomy consulted for evaluation, no acute indication for RRT but recommending IR for vein mapping.  - plan for IR vein mapping today     To contact Interventional Radiology with any questions, concerns or issues please utilize the following:  M-F 7am-5pm: [facility]_ir on teams,  consult spectra: [phone]  After hours/weekends/holidays: [phone]

## 2024-11-27 ENCOUNTER — TRANSCRIPTION ENCOUNTER (OUTPATIENT)
Age: 74
End: 2024-11-27

## 2024-11-27 VITALS
HEART RATE: 75 BPM | SYSTOLIC BLOOD PRESSURE: 143 MMHG | OXYGEN SATURATION: 98 % | RESPIRATION RATE: 18 BRPM | TEMPERATURE: 98 F | DIASTOLIC BLOOD PRESSURE: 78 MMHG

## 2024-11-27 LAB
ALBUMIN SERPL ELPH-MCNC: 3.6 G/DL — SIGNIFICANT CHANGE UP (ref 3.5–5.2)
ALP SERPL-CCNC: 57 U/L — SIGNIFICANT CHANGE UP (ref 30–115)
ALT FLD-CCNC: 10 U/L — SIGNIFICANT CHANGE UP (ref 0–41)
ANION GAP SERPL CALC-SCNC: 15 MMOL/L — HIGH (ref 7–14)
AST SERPL-CCNC: 9 U/L — SIGNIFICANT CHANGE UP (ref 0–41)
BASOPHILS # BLD AUTO: 0.02 K/UL — SIGNIFICANT CHANGE UP (ref 0–0.2)
BASOPHILS NFR BLD AUTO: 0.4 % — SIGNIFICANT CHANGE UP (ref 0–1)
BILIRUB SERPL-MCNC: 0.2 MG/DL — SIGNIFICANT CHANGE UP (ref 0.2–1.2)
BUN SERPL-MCNC: 89 MG/DL — CRITICAL HIGH (ref 10–20)
CALCIUM SERPL-MCNC: 7.6 MG/DL — LOW (ref 8.4–10.4)
CHLORIDE SERPL-SCNC: 112 MMOL/L — HIGH (ref 98–110)
CO2 SERPL-SCNC: 18 MMOL/L — SIGNIFICANT CHANGE UP (ref 17–32)
CREAT SERPL-MCNC: 5.5 MG/DL — CRITICAL HIGH (ref 0.7–1.5)
EGFR: 10 ML/MIN/1.73M2 — LOW
EOSINOPHIL # BLD AUTO: 0.29 K/UL — SIGNIFICANT CHANGE UP (ref 0–0.7)
EOSINOPHIL NFR BLD AUTO: 6.2 % — SIGNIFICANT CHANGE UP (ref 0–8)
GLUCOSE SERPL-MCNC: 85 MG/DL — SIGNIFICANT CHANGE UP (ref 70–99)
HCT VFR BLD CALC: 25 % — LOW (ref 42–52)
HGB BLD-MCNC: 8.2 G/DL — LOW (ref 14–18)
IMM GRANULOCYTES NFR BLD AUTO: 0.2 % — SIGNIFICANT CHANGE UP (ref 0.1–0.3)
LYMPHOCYTES # BLD AUTO: 1.16 K/UL — LOW (ref 1.2–3.4)
LYMPHOCYTES # BLD AUTO: 24.9 % — SIGNIFICANT CHANGE UP (ref 20.5–51.1)
MCHC RBC-ENTMCNC: 27.4 PG — SIGNIFICANT CHANGE UP (ref 27–31)
MCHC RBC-ENTMCNC: 32.8 G/DL — SIGNIFICANT CHANGE UP (ref 32–37)
MCV RBC AUTO: 83.6 FL — SIGNIFICANT CHANGE UP (ref 80–94)
MONOCYTES # BLD AUTO: 0.45 K/UL — SIGNIFICANT CHANGE UP (ref 0.1–0.6)
MONOCYTES NFR BLD AUTO: 9.7 % — HIGH (ref 1.7–9.3)
NEUTROPHILS # BLD AUTO: 2.73 K/UL — SIGNIFICANT CHANGE UP (ref 1.4–6.5)
NEUTROPHILS NFR BLD AUTO: 58.6 % — SIGNIFICANT CHANGE UP (ref 42.2–75.2)
NRBC # BLD: 0 /100 WBCS — SIGNIFICANT CHANGE UP (ref 0–0)
PLATELET # BLD AUTO: 207 K/UL — SIGNIFICANT CHANGE UP (ref 130–400)
PMV BLD: 9.2 FL — SIGNIFICANT CHANGE UP (ref 7.4–10.4)
POTASSIUM SERPL-MCNC: 4.3 MMOL/L — SIGNIFICANT CHANGE UP (ref 3.5–5)
POTASSIUM SERPL-SCNC: 4.3 MMOL/L — SIGNIFICANT CHANGE UP (ref 3.5–5)
PROT SERPL-MCNC: 6 G/DL — SIGNIFICANT CHANGE UP (ref 6–8)
RBC # BLD: 2.99 M/UL — LOW (ref 4.7–6.1)
RBC # FLD: 15.5 % — HIGH (ref 11.5–14.5)
SODIUM SERPL-SCNC: 145 MMOL/L — SIGNIFICANT CHANGE UP (ref 135–146)
WBC # BLD: 4.66 K/UL — LOW (ref 4.8–10.8)
WBC # FLD AUTO: 4.66 K/UL — LOW (ref 4.8–10.8)

## 2024-11-27 RX ORDER — CALCIUM ACETATE 667 MG/5ML
1 SOLUTION ORAL
Qty: 0 | Refills: 0 | DISCHARGE
Start: 2024-11-27

## 2024-11-27 RX ORDER — LYSINE HCL 500 MG
1 TABLET ORAL
Qty: 0 | Refills: 0 | DISCHARGE
Start: 2024-11-27

## 2024-11-27 RX ORDER — SODIUM BICARBONATE 84 MG/ML
1 INJECTION, SOLUTION INTRAVENOUS
Refills: 0 | DISCHARGE

## 2024-11-27 RX ORDER — SODIUM BICARBONATE 84 MG/ML
1 INJECTION, SOLUTION INTRAVENOUS
Qty: 90 | Refills: 0
Start: 2024-11-27 | End: 2024-12-26

## 2024-11-27 RX ORDER — CALCIUM ACETATE 667 MG/5ML
1 SOLUTION ORAL
Qty: 90 | Refills: 0
Start: 2024-11-27 | End: 2024-12-26

## 2024-11-27 RX ADMIN — Medication 10 MILLIGRAM(S): at 12:31

## 2024-11-27 RX ADMIN — SODIUM BICARBONATE 1300 MILLIGRAM(S): 84 INJECTION, SOLUTION INTRAVENOUS at 12:31

## 2024-11-27 RX ADMIN — CALCIUM ACETATE 667 MILLIGRAM(S): 667 SOLUTION ORAL at 12:31

## 2024-11-27 RX ADMIN — SODIUM BICARBONATE 1300 MILLIGRAM(S): 84 INJECTION, SOLUTION INTRAVENOUS at 06:10

## 2024-11-27 RX ADMIN — CHLORHEXIDINE GLUCONATE 1 APPLICATION(S): 1.2 RINSE ORAL at 06:13

## 2024-11-27 RX ADMIN — Medication 1 TABLET(S): at 12:31

## 2024-11-27 RX ADMIN — AMLODIPINE BESYLATE 10 MILLIGRAM(S): 10 TABLET ORAL at 06:10

## 2024-11-27 RX ADMIN — CALCIUM ACETATE 667 MILLIGRAM(S): 667 SOLUTION ORAL at 08:33

## 2024-11-27 RX ADMIN — PANTOPRAZOLE SODIUM 40 MILLIGRAM(S): 40 TABLET, DELAYED RELEASE ORAL at 06:10

## 2024-11-27 RX ADMIN — Medication 5000 UNIT(S): at 06:13

## 2024-11-27 NOTE — DISCHARGE NOTE NURSING/CASE MANAGEMENT/SOCIAL WORK - FINANCIAL ASSISTANCE
[facility] provides services at a reduced cost to those who are determined to be eligible through [facility]’s financial assistance program. Information regarding [facility]’s financial assistance program can be found by going to [email or web address] or by calling [phone].

## 2024-11-27 NOTE — DISCHARGE NOTE PROVIDER - NSDCFUADDINST_GEN_ALL_CORE_FT
Please see nephrology Dr. Ferris one week after discharge after getting bloodwork Follow up with Dr. Brunner on Tuesday 12/3/24 between 2-4pm  Please see nephrology Dr. Ferris one week after discharge after getting bloodwork

## 2024-11-27 NOTE — DISCHARGE NOTE NURSING/CASE MANAGEMENT/SOCIAL WORK - PATIENT PORTAL LINK FT
You can access the FollowMyHealth Patient Portal offered by [facility] by registering at the following website: [email or web address]. By joining [employer]’s FollowMyHealth portal, you will also be able to view your health information using other applications (apps) compatible with our system.

## 2024-11-27 NOTE — DISCHARGE NOTE PROVIDER - NSDCCPCAREPLAN_GEN_ALL_CORE_FT
PRINCIPAL DISCHARGE DIAGNOSIS  Diagnosis: Acute kidney injury superimposed on CKD  Assessment and Plan of Treatment: You were noted to have a temporary insult to your kidney function either at the time that you arrived at the hospital or during your stay here. We have monitored your kidney function with blood work during your time here and you are at a level that no longer requires continued hospital level care. We do recommend that you follow up to continually have your kidney function checked. You can follow up with your primary care doctor, or, if recommended in the discharge paperwork, you should follow up with a kidney specialist called a nephrologist.  Follow up with Dr. Ferris in one with with bloodwork      SECONDARY DISCHARGE DIAGNOSES  Diagnosis: Metabolic acidosis  Assessment and Plan of Treatment:

## 2024-11-27 NOTE — PROGRESS NOTE ADULT - SUBJECTIVE AND OBJECTIVE BOX
Nephrology progress note    THIS IS AN INCOMPLETE NOTE . FULL NOTE TO FOLLOW SHORTLY    Patient is seen and examined, events over the last 24 h noted .    Allergies:  No Known Allergies    Hospital Medications:   MEDICATIONS  (STANDING):  amLODIPine   Tablet 10 milliGRAM(s) Oral daily  calcium acetate 667 milliGRAM(s) Oral three times a day with meals  calcium carbonate 1250 mG  + Vitamin D (OsCal 500 + D) 1 Tablet(s) Oral daily  chlorhexidine 2% Cloths 1 Application(s) Topical <User Schedule>  ezetimibe 10 milliGRAM(s) Oral daily  heparin   Injectable 5000 Unit(s) SubCutaneous every 12 hours  lactated ringers. 1000 milliLiter(s) (75 mL/Hr) IV Continuous <Continuous>  pantoprazole    Tablet 40 milliGRAM(s) Oral before breakfast  polyethylene glycol 3350 17 Gram(s) Oral every 12 hours  senna 2 Tablet(s) Oral at bedtime  sodium bicarbonate 1300 milliGRAM(s) Oral every 8 hours        VITALS:  T(F): 98 (11-27-24 @ 05:58), Max: 98.2 (11-26-24 @ 20:29)  HR: 74 (11-27-24 @ 05:58)  BP: 138/73 (11-27-24 @ 05:58)  RR: 18 (11-27-24 @ 05:58)  SpO2: 96% (11-27-24 @ 05:58)  Wt(kg): --    11-25 @ 07:01  -  11-26 @ 07:00  --------------------------------------------------------  IN: 1200 mL / OUT: 1404 mL / NET: -204 mL    11-26 @ 07:01  -  11-27 @ 07:00  --------------------------------------------------------  IN: 2055 mL / OUT: 3650 mL / NET: -1595 mL      Height (cm): 175.3 (11-26 @ 12:00)  Weight (kg): 80 (11-26 @ 12:00)  BMI (kg/m2): 26 (11-26 @ 12:00)  BSA (m2): 1.96 (11-26 @ 12:00)    PHYSICAL EXAM:  Constitutional: NAD  HEENT: anicteric sclera, oropharynx clear, MMM  Neck: No JVD  Respiratory: CTAB, no wheezes, rales or rhonchi  Cardiovascular: S1, S2, RRR  Gastrointestinal: BS+, soft, NT/ND  Extremities: No cyanosis or clubbing. No peripheral edema  :  No ross.   Skin: No rashes    LABS:  11-26    145  |  112[H]  |  102[HH]  ----------------------------<  101[H]  3.3[L]   |  19  |  5.6[HH]    Ca    7.3[L]      26 Nov 2024 05:03    TPro  5.8[L]  /  Alb  3.5  /  TBili  0.2  /  DBili      /  AST  8   /  ALT  9   /  AlkPhos  57  11-26                          7.7    5.23  )-----------( 211      ( 26 Nov 2024 05:03 )             23.6       Urine Studies:  Urinalysis Basic - ( 26 Nov 2024 05:03 )    Color:  / Appearance:  / SG:  / pH:   Gluc: 101 mg/dL / Ketone:   / Bili:  / Urobili:    Blood:  / Protein:  / Nitrite:    Leuk Esterase:  / RBC:  / WBC    Sq Epi:  / Non Sq Epi:  / Bacteria:           Iron 58, TIBC 188, %sat 31      [11-25-24 @ 04:55]  Ferritin 166      [11-25-24 @ 04:55]  Vitamin D (25OH) 15      [11-25-24 @ 04:55]          RADIOLOGY & ADDITIONAL STUDIES:   Nephrology progress note    Patient is seen and examined, events over the last 24 h noted .  Lying in bed comfortable     Allergies:  No Known Allergies    Hospital Medications:   MEDICATIONS  (STANDING):  amLODIPine   Tablet 10 milliGRAM(s) Oral daily  calcium acetate 667 milliGRAM(s) Oral three times a day with meals  calcium carbonate 1250 mG  + Vitamin D (OsCal 500 + D) 1 Tablet(s) Oral daily  ezetimibe 10 milliGRAM(s) Oral daily  heparin   Injectable 5000 Unit(s) SubCutaneous every 12 hours  lactated ringers. 1000 milliLiter(s) (75 mL/Hr) IV Continuous <Continuous>  pantoprazole    Tablet 40 milliGRAM(s) Oral before breakfast  polyethylene glycol 3350 17 Gram(s) Oral every 12 hours  senna 2 Tablet(s) Oral at bedtime  sodium bicarbonate 1300 milliGRAM(s) Oral every 8 hours        VITALS:  T(F): 98 (11-27-24 @ 05:58), Max: 98.2 (11-26-24 @ 20:29)  HR: 74 (11-27-24 @ 05:58)  BP: 138/73 (11-27-24 @ 05:58)  RR: 18 (11-27-24 @ 05:58)  SpO2: 96% (11-27-24 @ 05:58)      11-25 @ 07:01  -  11-26 @ 07:00  --------------------------------------------------------  IN: 1200 mL / OUT: 1404 mL / NET: -204 mL    11-26 @ 07:01  -  11-27 @ 07:00  --------------------------------------------------------  IN: 2055 mL / OUT: 3650 mL / NET: -1595 mL      Height (cm): 175.3 (11-26 @ 12:00)  Weight (kg): 80 (11-26 @ 12:00)  BMI (kg/m2): 26 (11-26 @ 12:00)  BSA (m2): 1.96 (11-26 @ 12:00)    PHYSICAL EXAM:  Constitutional: NAD  Respiratory: CTAB,  Cardiovascular: S1, S2, RRR  Gastrointestinal: BS+, soft, NT/ND  Extremities: No cyanosis or clubbing. No peripheral edema  :  No ross.   Skin: No rashes    LABS:  11-26    145  |  112[H]  |  102[HH]  ----------------------------<  101[H]  3.3[L]   |  19  |  5.6[HH]    Ca    7.3[L]      26 Nov 2024 05:03    TPro  5.8[L]  /  Alb  3.5  /  TBili  0.2  /  DBili      /  AST  8   /  ALT  9   /  AlkPhos  57  11-26                          7.7    5.23  )-----------( 211      ( 26 Nov 2024 05:03 )             23.6       Urine Studies:  Urinalysis Basic - ( 26 Nov 2024 05:03 )    Color:  / Appearance:  / SG:  / pH:   Gluc: 101 mg/dL / Ketone:   / Bili:  / Urobili:    Blood:  / Protein:  / Nitrite:    Leuk Esterase:  / RBC:  / WBC    Sq Epi:  / Non Sq Epi:  / Bacteria:           Iron 58, TIBC 188, %sat 31      [11-25-24 @ 04:55]  Ferritin 166      [11-25-24 @ 04:55]  Vitamin D (25OH) 15      [11-25-24 @ 04:55]          RADIOLOGY & ADDITIONAL STUDIES:

## 2024-11-27 NOTE — DISCHARGE NOTE PROVIDER - PROVIDER TOKENS
PROVIDER:[TOKEN:[[id number]],FOLLOWUP:[1 week],ESTABLISHEDPATIENT:[T]],PROVIDER:[TOKEN:[[id number]],FOLLOWUP:[1 week]]

## 2024-11-27 NOTE — DISCHARGE NOTE PROVIDER - HOSPITAL COURSE
74-year-old man past medical history significant for hypertension, dyslipidemia, CKD 4, nephrolithiasis, urothelial carcinoma status post nephrectomy and bladder removal, with ileal conduit urinary diversion urostomy, colon cancer?, (patient says he has colon resection but not clear if because colon cancer or for ileal conduit), prostate cancer, reports his last immunotherapy treatment was 1.5 years ago after he had a neck lymph node biopsy, presents to the ED after he was told by his nephrologist to present to the ED for possible dialysis.  Patient complaining of 1 week of anorexia, nausea (no vomiting), dry mouth, intermittent hematuria and decreased urine output. He also endorses constipation and decreased Apetite. No fever, chills.  No cough or URI symptoms.  No abdominal pain or back pain or any other symptoms.        ED course:  - vitals: VS besides /71, satting 96% on RA.  - labs: hb 9.5 (baseline 8-9), Cr 6.4 (baseline around 4), , AG 17, corrected Ca 8, PO4 5.2, vbg PH 7.08, CO2 30, HCO3 9.  - CXR: wnl.     Admitted for [facility] 2/2 renal failure.     SDU course:    Patient was seen by nephro he was started on bicarb drip given low bicarb in setting of worsening kim vs progression ckd, started on oral bicarb, patient bicarb improved on 11/26, stopped the drip. IR consulted for vein mapping. As per nephro no current acute indication for RRT, he was started on LR 75cc/hr instead of bicarb drip. Stable and safe for discharge    #KIM on CKD VS CKD progression   #HAGMA   - improving Cr 6.4 on presentation (baseline around 4), , AG 17, corrected Ca 8, PO4 5.2, vbg PH 7.08, CO2 30, HCO3 9.  - urine output: 1000  - nephrology: check renal bladder US,  Post right nephrectomy and cystectomy.  US kidney Multiple left renal stones better seen on same day CT. No hydronephrosis. .CT showed Multiple non obstructing left renal calculi measure up to 2.0 cm within   left renal lower pole (approximately 700 Hounsfield units.)  IVF bicarb drip at 100 cc per hour -> switched to LR 75 given improved bicarb   No acute indication for RRT 11/26 nephro following   IR mapping vein 11/26  - follow up out-patient with Dr. 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      #Anemia  - labs: hb 8 from  9.5 (baseline 8-9), MCV 86  - most likely anemia of chronic disease 2/2 ESRD  - follow up anemia work up and trend cbc, active t and s     #DLD  #HTN  - c/w home meds    #Constipation  - abd sof lax, distended   - bowel regimen     Discussion of discharge plan of care, including discharge diagnoses, medication reconciliation, and follow-ups was discussed with Dr. Brunner on 11/27/2024 and discharge was approved.

## 2024-11-27 NOTE — DISCHARGE NOTE PROVIDER - CARE PROVIDER_API CALL
Duc Brunner  Internal Medicine  [address] [place] [address]  Phone: [phone]  Fax: [phone]  Established Patient  Follow Up Time: 1 week    Jihan Licona  Nephrology  [address] [place] [address]  Phone: [phone]  Fax: [phone]  Follow Up Time: 1 week

## 2024-11-27 NOTE — DISCHARGE NOTE PROVIDER - NSDCMRMEDTOKEN_GEN_ALL_CORE_FT
amLODIPine 10 mg oral tablet: 1 tab(s) orally once a day  ezetimibe 10 mg oral tablet: 1 tab(s) orally once a day  pregabalin 50 mg oral capsule: 1 cap(s) orally once a day (at bedtime)  sodium bicarbonate 650 mg oral tablet: 1 tab(s) orally 3 times a day  vitamin D-calcium (as carbonate) 5 mcg-500 mg oral tablet: 1 tab(s) orally once a day   amLODIPine 10 mg oral tablet: 1 tab(s) orally once a day  calcium acetate 667 mg oral tablet: 1 tab(s) orally 3 times a day (with meals)  ezetimibe 10 mg oral tablet: 1 tab(s) orally once a day  pregabalin 50 mg oral capsule: 1 cap(s) orally once a day (at bedtime)  sodium bicarbonate 650 mg oral tablet: 1 tab(s) orally 3 times a day  vitamin D-calcium (as carbonate) 5 mcg-500 mg oral tablet: 1 tab(s) orally once a day

## 2024-11-27 NOTE — PROGRESS NOTE ADULT - ASSESSMENT
74-year-old man past medical history significant for hypertension, CKD 4 , dyslipidemia, urothelial carcinoma status post right nephrectomy with urostomy, colon cancer, (colon resection), prostate cancer, reports his last immunotherapy treatment was 1.5 years ago presenting for worsening kidney function  # KIM on CKD 4   # Acidosis high anion gap   # RCC sp right nephrectomy   # anemia chronic   on LR now    continue sodium bicarbonate 1300 po q 8   cr stable /slightly better    non oliguric   bp controlled    CT  No evidence of urinary tract obstruction.  2. Multiple nonobstructing left renal calculi measure up to 2.0 cm within   left renal lower pole (approximately 700 Hounsfield units.)  3. Status post right nephrectomy, cystoprostatectomy and right lower   quadrant urostomy; postoperative changes are noted along the left renal   upper pole, with a 4.9 cm indeterminate left renal upper pole cystic   structure, possibly related to prior procedure.  4. Contracted gallbladder with cholelithiasis.  ph at goal   IR consult for vein mapping noted   no acute indication for RRT today     stable from renal standpoint for discharge and follow up with Dr Hinojosa next week for set up and preparation for HD

## 2024-11-27 NOTE — DISCHARGE NOTE NURSING/CASE MANAGEMENT/SOCIAL WORK - NSPROEXTENSIONSOFSELF_GEN_A_NUR
Cardiology Follow-up Consultation Note    Date of note:    7/11/2023    Primary Care Provider: Srikanth Bermudez M.D.    Name:             Terrance Marie   YOB: 1948  MRN:               [id number]    No chief complaint on file.      HISTORY OF PRESENT ILLNESS  Mr. Terrance Marie is a 75 y.o. male who returns to see us for follow-up of CAD    Pertinent History:  CAD: Stable angina in 2017 s/p JEFFERY-RCA.  Symptoms of angina with [place] the airport.  Not with exertion.  Was on aspirin which he stopped several years ago  HTN  Dyslipidemia  Parkinson disease    During last clinic visit 4/10/2023:  Since his last visit, he was doing well until about 3 weeks ago when he started having chest discomfort while walking [place] the airport.  Symptoms reminded him of his previous event when he required stent placement to RCA.  At that time, he was noted to have left circumflex disease which was not significant enough.    Played [employer] yesterday and had to stop due to intense chest pressure.    Interim events:  Since his last visit, patient underwent coronary angiogram with successful PCI-OM1 with 1 JEFFERY.  Noted to have mild aortic stenosis with 25 mmHg transvalvular aortic gradient.  Has been doing well without anginal symptoms.    Is wondering when he can discontinue Plavix due to frequent bruising.  No bleeding concerns.      Past Medical History:   Diagnosis Date    Anginal syndrome (HCC)     Arthritis     knees    Heart murmur 1980    Heart valve disease     High cholesterol     Hypertension     Pain     lower back down both legs    Parkinson disease (HCC)     Spinal stenosis          Past Surgical History:   Procedure Laterality Date    PB TOTAL KNEE ARTHROPLASTY Left 11/27/2019    Procedure: ARTHROPLASTY, KNEE, TOTAL;  Surgeon: Saw Travis M.D.;  Location: SURGERY [facility];  Service: Orthopedics    STENT PLACEMENT  03/31/2017    DRCA    FUSION, SPINE, LUMBAR, PLIF  2/5/2015     Performed by Turner Walker M.D. at SURGERY [facility]    BLEPHAROPLASTY  12/14/2012    Performed by Jayme German M.D. at SURGERY [facility]    OTHER NEUROLOGICAL SURG  2001    spinal fusion    RECONSTRUCTION, KNEE, ACL Right 1994    ACL transplant right  knee    TONSILLECTOMY      as a child         Current Outpatient Medications   Medication Sig Dispense Refill    amLODIPine (NORVASC) 10 MG Tab Take 1 Tablet by mouth every evening. 100 Tablet 3    rosuvastatin (CRESTOR) 40 MG tablet Take 1 Tablet by mouth at bedtime. 100 Tablet 3    Omega-3 Fatty Acids (FISH OIL) 1000 MG Cap capsule Take 1,000 mg by mouth 3 times a day with meals.      clopidogrel (PLAVIX) 75 MG Tab Take 1 Tablet by mouth every day. 90 Tablet 3    ketoconazole (NIZORAL) 2 % Cream APPLY THIN LAYER TO AFFECTED AREAS ON FACE TWICE A DAY - FOR RASH      aspirin (ASA) 81 MG Chew Tab chewable tablet Chew 81 mg every day.      hydroCHLOROthiazide (HYDRODIURIL) 25 MG Tab TAKE 1 TABLET BY MOUTH EVERY DAY IN THE MORNING 90 Tablet 3    losartan (COZAAR) 100 MG Tab TAKE 1 TABLET BY MOUTH EVERY  Tablet 1    ALPRAZolam (XANAX) 0.25 MG Tab Take 0.25 mg by mouth as needed.      carbidopa-levodopa SR (SINEMET CR)  MG per tablet Take 1 Tablet by mouth 3 times a day. 270 Tablet 3    entacapone (COMTAN) 200 MG Tab Take 1 Tablet by mouth 3 times a day. 270 Tablet 3    Cyanocobalamin (VITAMIN B 12 PO) Take  by mouth every morning.       No current facility-administered medications for this visit.         No Known Allergies      Family History   Problem Relation Age of Onset    Other Mother         SPINAL STENOSIS    Heart Disease Father         HAD STENT    No Known Problems Brother     Stroke Maternal Grandmother     Cancer Maternal Grandfather         prostate cancer    Heart Disease Paternal Grandmother     Other Paternal Grandfather         emphesema, worked in cold mine    No Known Problems Brother     No Known Problems Daughter           Social History     Socioeconomic History    Marital status:      Spouse name: Not on file    Number of children: Not on file    Years of education: Not on file    Highest education level: Master's degree (e.g., MA, MS, Parag, MEd, MSW, MICHEAL)   Occupational History    Not on file   Tobacco Use    Smoking status: Never    Smokeless tobacco: Never   Vaping Use    Vaping Use: Never used   Substance and Sexual Activity    Alcohol use: Yes     Alcohol/week: 10.2 oz     Types: 14 Glasses of wine, 3 Standard drinks or equivalent per week     Comment: 2-3 per day    Drug use: Yes     Types: Inhaled     Comment: rare marijuana smoking, last use  2 weeks ago    Sexual activity: Yes     Partners: Female   Other Topics Concern    Not on file   Social History Narrative    Not on file     Social Determinants of Health     Financial Resource Strain: Low Risk  (8/5/2022)    Overall Financial Resource Strain (CARDIA)     Difficulty of Paying Living Expenses: Not hard at all   Food Insecurity: No Food Insecurity (8/5/2022)    Hunger Vital Sign     Worried About Running Out of Food in the Last Year: Never true     Ran Out of Food in the Last Year: Never true   Transportation Needs: No Transportation Needs (8/5/2022)    PRAPARE - Transportation     Lack of Transportation (Medical): No     Lack of Transportation (Non-Medical): No   Physical Activity: Sufficiently Active (8/5/2022)    Exercise Vital Sign     Days of Exercise per Week: 6 days     Minutes of Exercise per Session: 50 min   Stress: No Stress Concern Present (8/5/2022)    [employer] [place] [employer] - Occupational Stress Questionnaire     Feeling of Stress : Not at all   Social Connections: Moderately Integrated (8/5/2022)    Social Connection and Isolation Panel [NHANES]     Frequency of Communication with Friends and Family: More than three times a week     Frequency of Social Gatherings with Friends and Family: More than three times a week      "Attends Yazidi Services: Never     Active Member of Clubs or Organizations: Yes     Attends Club or Organization Meetings: More than 4 times per year     Marital Status:    Intimate Partner Violence: Not on file   Housing Stability: Low Risk  (8/5/2022)    Housing Stability Vital Sign     Unable to Pay for Housing in the Last Year: No     Number of Places Lived in the Last Year: 1     Unstable Housing in the Last Year: No         Physical Exam:  Ambulatory Vitals  /58 (BP Location: Left arm, Patient Position: Sitting, BP Cuff Size: Adult)   Pulse 67   Resp 18   Ht 1.651 m (5' 5\")   Wt 81.6 kg (180 lb)   SpO2 97%    Oxygen Therapy:  Pulse Oximetry: 97 %  BP Readings from Last 4 Encounters:   07/11/23 118/58   04/19/23 135/66   04/10/23 110/50   02/01/23 126/60       Weight/BMI: Body mass index is 29.95 kg/m².  Wt Readings from Last 4 Encounters:   07/11/23 81.6 kg (180 lb)   04/19/23 82.5 kg (181 lb 14.1 oz)   04/10/23 82.6 kg (182 lb)   02/01/23 83.6 kg (184 lb 4.9 oz)       GEN: Well developed, well nourished and in no acute distress.  HEART: no significant JVD, regular rate and rhythm, normal S1 and S2, systolic murmur heard throughout the precordium with radiation to carotids,  no third heart sounds, normal cardiac palpation  LUNG: clear to auscultation bilaterally, no wheezing, no crackles, normal respiratory effort on room air  ABDOMEN: soft, non-tender, non-distended, normal bowel sounds throughout  EXTREMITIES: no peripheral edema noted  VASCULAR: no significantly elevated jugular venous pressure, radial pulses 2+ and equal      Lab Data Review:  Lab Results   Component Value Date/Time    CHOLSTRLTOT 144 04/11/2023 08:16 AM    LDL 58 04/11/2023 08:16 AM    HDL 74 04/11/2023 08:16 AM    TRIGLYCERIDE 58 04/11/2023 08:16 AM       Lab Results   Component Value Date/Time    SODIUM 138 04/17/2023 11:00 AM    POTASSIUM 4.2 04/17/2023 11:00 AM    CHLORIDE 102 04/17/2023 11:00 AM    CO2 25 " 04/17/2023 11:00 AM    GLUCOSE 111 (H) 04/17/2023 11:00 AM    BUN 20 04/17/2023 11:00 AM    CREATININE 1.06 04/17/2023 11:00 AM    CREATININE 0.9 06/19/2007 04:35 AM     Lab Results   Component Value Date/Time    ALKPHOSPHAT 73 04/17/2023 11:00 AM    ASTSGOT 18 04/17/2023 11:00 AM    ALTSGPT <5 04/17/2023 11:00 AM    TBILIRUBIN 0.6 04/17/2023 11:00 AM      Lab Results   Component Value Date/Time    WBC 7.0 04/17/2023 11:00 AM     Lab Results   Component Value Date/Time    HBA1C 5.5 09/27/2022 11:09 AM       Cardiac Imaging and Procedures Review:    EKG dated 4/19/2023: My personal interpretation is sinus rhythm    EKG dated 4/10/2023: My personal interpretation is sinus rhythm    Echo: Echocardiogram performed on 4/13/2023 was personally interpreted by me which shows normal LV size and function, mild aortic valve stenosis    Echo dated 12/31/2019:   Compared to the images of the prior study done  on 10/29/2018 there has   been no significant change.   Left ventricular ejection fraction is visually estimated to be 65%.  Mild aortic stenosis.  Right ventricular systolic pressure is estimated to be 20  mmHg.    Echo dated 10/29/2018:   Compared to the images of the prior study done on 06/16/16, no   significant changes are noted.  Mild aortic stenosis.  Left ventricular ejection fraction is visually estimated to be 60%.  Unable to estimate pulmonary artery pressure due to an inadequate   tricuspid regurgitant jet.    ETT 5/2/2023:   Negative stress ECG for ischemia.    Normal blood pressure response during stress.   Fair exercise tolerance at 8.9 METS.    Nuclear Perfusion Imaging (6/7/2016):    No evidence of significant ischemic myocardium or prior myocardial    infarction, diaphragm attenuation as noted above. .   Normal left ventricular size, ejection fraction, and wall motion.   Negative stress EKG for ischemic changes.   ECG INTERPRETATION   Minor non specific T wave changes.    [facility] 4/19/2023:  Coronary Anatomy    none            Left Main:  Calcific vessel, diffuse 30% stenosis.               LAD:  Long calcific 40% stenosis proximally. The first diagonal is small and normal. The second diagonal is moderate in size with a proximal 30% stenosis.               LCx:  Calcific vessel with 30% ostial stenosis. The first OM has proximal 80% stenosis the iFR is 0.83. The second OM is normal              RCA: Dominant, with an anterior takeoff.  There is diffuse, 20% stenosis and extrinsic calcification throughout the AV groove.  The distal RCA is a widely patent stent which extends into the posterior descending artery.  The posterolateral branch has proximal 30% stenosis.  IMPRESSIONS:  1.  Unstable angina due to progressive stenosis of the first OM  2.  Successful PCI of the first OM using 1 drug-eluting stent, IVUS guidance  3.  Mild elevation LVEDP at 22 mmHg  4.  Mild aortic stenosis with peak to peak pullback gradient of 25 mmHg    [facility] (3/31/2017):   DIAGNOSES:  1.  Successful drug-eluting stent of the right coronary artery.  2.  Fractional flow reserve measurement intermediate lesion on the circumflex marginal of 0.91.    FINDINGS:  1.  Hemodynamics -- please see attached sheet for details.  ED was 14.  There is no gradient across the aortic valve.  2.  Left ventriculography revealed a normal size left ventricle with normal wall motion, normal ejection fraction, no MR.  3.  Coronary angiography:  A.  Left main normal.  B.  LAD minimal luminal irregularities.  There appears to be a fair amount of   intermural calcium proximally.  It makes it looks like a stent, but he has   never received one.  C.  Left circumflex is moderate in size, nondominant, and there is a modest   size first marginal with a proximal concentric 50-60% stenosis.  D.  The RCA is moderate in size, dominant and just before the bifurcation   there is an 80% concentric stenosis.      Assessment & Plan     1. Essential hypertension        2. Presence of stent in  coronary artery  Referral to Cardiac Rehab      3. Mixed hyperlipidemia        4. Mild aortic stenosis        5. Dyslipidemia        6. Coronary artery disease involving native coronary artery of native heart without angina pectoris  Referral to Cardiac Rehab            Doing well from a cardiac perspective without anginal symptoms.  Continue DAPT with aspirin and Plavix until October 2023 after which Plavix can be discontinued.  Aspirin indefinitely after which she understands.    Referral to cardiac rehab with recent stent placement for unstable angina.      For mild aortic stenosis, surveillance echocardiogram every 1-2 years.      LDL within goal.  Continue high intensity statin with Crestor 40 mg daily.    Blood pressure well controlled.  Continue current medications without any changes.      I have independently interpreted test results and discussed results and management with the patient.    All of patient's excellent questions were answered to the best of my knowledge and to his satisfaction.  It was a pleasure seeing Mr. Terrance Marie in my clinic today. Return in about 6 months (around 1/11/2024). Patient is aware to call the cardiology clinic with any questions or concerns.      Betito Ford MD  [facility]  [facility], [facility].  [address] [address]  Phone: [phone]  Fax: [phone]    Please note that this dictation was created using voice recognition software. I have made every reasonable attempt to correct obvious errors, but it is possible there are errors of grammar and possibly content that I did not discover before finalizing the note.

## 2024-12-04 DIAGNOSIS — N20.0 CALCULUS OF KIDNEY: ICD-10-CM

## 2024-12-04 DIAGNOSIS — N18.6 END STAGE RENAL DISEASE: ICD-10-CM

## 2024-12-04 DIAGNOSIS — Z85.528 PERSONAL HISTORY OF OTHER MALIGNANT NEOPLASM OF KIDNEY: ICD-10-CM

## 2024-12-04 DIAGNOSIS — Z98.41 CATARACT EXTRACTION STATUS, RIGHT EYE: ICD-10-CM

## 2024-12-04 DIAGNOSIS — Z90.5 ACQUIRED ABSENCE OF KIDNEY: ICD-10-CM

## 2024-12-04 DIAGNOSIS — Z85.51 PERSONAL HISTORY OF MALIGNANT NEOPLASM OF BLADDER: ICD-10-CM

## 2024-12-04 DIAGNOSIS — Z85.54 PERSONAL HISTORY OF MALIGNANT NEOPLASM OF URETER: ICD-10-CM

## 2024-12-04 DIAGNOSIS — K59.00 CONSTIPATION, UNSPECIFIED: ICD-10-CM

## 2024-12-04 DIAGNOSIS — E78.5 HYPERLIPIDEMIA, UNSPECIFIED: ICD-10-CM

## 2024-12-04 DIAGNOSIS — Z90.6 ACQUIRED ABSENCE OF OTHER PARTS OF URINARY TRACT: ICD-10-CM

## 2024-12-04 DIAGNOSIS — E87.29 OTHER ACIDOSIS: ICD-10-CM

## 2024-12-04 DIAGNOSIS — I12.0 HYPERTENSIVE CHRONIC KIDNEY DISEASE WITH STAGE 5 CHRONIC KIDNEY DISEASE OR END STAGE RENAL DISEASE: ICD-10-CM

## 2024-12-04 DIAGNOSIS — D63.1 ANEMIA IN CHRONIC KIDNEY DISEASE: ICD-10-CM

## 2024-12-04 DIAGNOSIS — N17.9 ACUTE KIDNEY FAILURE, UNSPECIFIED: ICD-10-CM

## 2024-12-12 ENCOUNTER — APPOINTMENT (OUTPATIENT)
Dept: INTERVENTIONAL RADIOLOGY/VASCULAR | Facility: CLINIC | Age: 74
End: 2024-12-12
Payer: MEDICARE

## 2024-12-12 VITALS
SYSTOLIC BLOOD PRESSURE: 150 MMHG | DIASTOLIC BLOOD PRESSURE: 71 MMHG | HEART RATE: 80 BPM | TEMPERATURE: 97.3 F | OXYGEN SATURATION: 95 %

## 2024-12-12 PROCEDURE — 99202 OFFICE O/P NEW SF 15 MIN: CPT

## 2024-12-30 ENCOUNTER — OUTPATIENT (OUTPATIENT)
Dept: OUTPATIENT SERVICES | Facility: HOSPITAL | Age: 74
LOS: 1 days | End: 2024-12-30
Payer: MEDICARE

## 2024-12-30 DIAGNOSIS — Z98.890 OTHER SPECIFIED POSTPROCEDURAL STATES: Chronic | ICD-10-CM

## 2024-12-30 DIAGNOSIS — Z90.49 ACQUIRED ABSENCE OF OTHER SPECIFIED PARTS OF DIGESTIVE TRACT: Chronic | ICD-10-CM

## 2024-12-30 DIAGNOSIS — Z90.5 ACQUIRED ABSENCE OF KIDNEY: Chronic | ICD-10-CM

## 2024-12-30 DIAGNOSIS — Z98.49 CATARACT EXTRACTION STATUS, UNSPECIFIED EYE: Chronic | ICD-10-CM

## 2024-12-30 PROCEDURE — 82330 ASSAY OF CALCIUM: CPT | Mod: 59

## 2024-12-30 PROCEDURE — 85014 HEMATOCRIT: CPT

## 2024-12-30 PROCEDURE — 83605 ASSAY OF LACTIC ACID: CPT

## 2024-12-30 PROCEDURE — 82803 BLOOD GASES ANY COMBINATION: CPT | Mod: 59

## 2024-12-30 PROCEDURE — 85018 HEMOGLOBIN: CPT

## 2024-12-30 PROCEDURE — 36415 COLL VENOUS BLD VENIPUNCTURE: CPT

## 2024-12-30 PROCEDURE — 84132 ASSAY OF SERUM POTASSIUM: CPT

## 2024-12-30 PROCEDURE — 80048 BASIC METABOLIC PNL TOTAL CA: CPT

## 2024-12-30 PROCEDURE — 84295 ASSAY OF SERUM SODIUM: CPT

## 2025-01-01 DIAGNOSIS — N18.9 CHRONIC KIDNEY DISEASE, UNSPECIFIED: ICD-10-CM

## 2025-01-08 ENCOUNTER — RESULT REVIEW (OUTPATIENT)
Age: 75
End: 2025-01-08

## 2025-01-08 ENCOUNTER — TRANSCRIPTION ENCOUNTER (OUTPATIENT)
Age: 75
End: 2025-01-08

## 2025-01-08 ENCOUNTER — OUTPATIENT (OUTPATIENT)
Dept: OUTPATIENT SERVICES | Facility: HOSPITAL | Age: 75
LOS: 1 days | Discharge: HOME | End: 2025-01-08
Payer: MEDICARE

## 2025-01-08 VITALS
OXYGEN SATURATION: 98 % | DIASTOLIC BLOOD PRESSURE: 65 MMHG | HEART RATE: 68 BPM | RESPIRATION RATE: 18 BRPM | SYSTOLIC BLOOD PRESSURE: 115 MMHG

## 2025-01-08 VITALS
HEIGHT: 68 IN | HEART RATE: 77 BPM | RESPIRATION RATE: 18 BRPM | SYSTOLIC BLOOD PRESSURE: 117 MMHG | TEMPERATURE: 98 F | DIASTOLIC BLOOD PRESSURE: 64 MMHG | OXYGEN SATURATION: 98 % | WEIGHT: 190.26 LBS

## 2025-01-08 DIAGNOSIS — Z90.5 ACQUIRED ABSENCE OF KIDNEY: Chronic | ICD-10-CM

## 2025-01-08 DIAGNOSIS — Z90.49 ACQUIRED ABSENCE OF OTHER SPECIFIED PARTS OF DIGESTIVE TRACT: Chronic | ICD-10-CM

## 2025-01-08 DIAGNOSIS — N18.6 END STAGE RENAL DISEASE: ICD-10-CM

## 2025-01-08 DIAGNOSIS — Z98.890 OTHER SPECIFIED POSTPROCEDURAL STATES: Chronic | ICD-10-CM

## 2025-01-08 DIAGNOSIS — Z98.49 CATARACT EXTRACTION STATUS, UNSPECIFIED EYE: Chronic | ICD-10-CM

## 2025-01-08 LAB
BASE EXCESS BLDV CALC-SCNC: -4.8 MMOL/L — LOW (ref -2–3)
CA-I SERPL-SCNC: 1.24 MMOL/L — SIGNIFICANT CHANGE UP (ref 1.15–1.33)
GAS PNL BLDV: 137 MMOL/L — SIGNIFICANT CHANGE UP (ref 136–145)
GAS PNL BLDV: SIGNIFICANT CHANGE UP
GAS PNL BLDV: SIGNIFICANT CHANGE UP
HCO3 BLDV-SCNC: 21 MMOL/L — LOW (ref 22–29)
HCT VFR BLDA CALC: 23 % — LOW (ref 39–51)
HGB BLD CALC-MCNC: 7.8 G/DL — LOW (ref 12.6–17.4)
LACTATE BLDV-MCNC: 0.7 MMOL/L — SIGNIFICANT CHANGE UP (ref 0.5–2)
PCO2 BLDV: 42 MMHG — SIGNIFICANT CHANGE UP (ref 42–55)
PH BLDV: 7.31 — LOW (ref 7.32–7.43)
PO2 BLDV: 41 MMHG — SIGNIFICANT CHANGE UP (ref 25–45)
POTASSIUM BLDV-SCNC: 4.9 MMOL/L — SIGNIFICANT CHANGE UP (ref 3.5–5.1)
SAO2 % BLDV: 57.9 % — LOW (ref 67–88)

## 2025-01-08 PROCEDURE — C1894: CPT

## 2025-01-08 PROCEDURE — 84295 ASSAY OF SERUM SODIUM: CPT

## 2025-01-08 PROCEDURE — 36836 PRQ AV FSTL CRTJ UXTR 1 ACS: CPT | Mod: RT

## 2025-01-08 PROCEDURE — 85014 HEMATOCRIT: CPT

## 2025-01-08 PROCEDURE — 85018 HEMOGLOBIN: CPT

## 2025-01-08 PROCEDURE — 82330 ASSAY OF CALCIUM: CPT

## 2025-01-08 PROCEDURE — 82803 BLOOD GASES ANY COMBINATION: CPT

## 2025-01-08 PROCEDURE — 84132 ASSAY OF SERUM POTASSIUM: CPT

## 2025-01-08 PROCEDURE — C1725: CPT

## 2025-01-08 PROCEDURE — C1769: CPT

## 2025-01-08 PROCEDURE — 83605 ASSAY OF LACTIC ACID: CPT

## 2025-01-08 PROCEDURE — C1889: CPT
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
## 2025-01-08 NOTE — ASU PATIENT PROFILE, ADULT - FALL HARM RISK - HARM RISK INTERVENTIONS

## 2025-01-08 NOTE — ASU DISCHARGE PLAN (ADULT/PEDIATRIC) - NS MD DC FALL RISK RISK
For information on Fall & Injury Prevention, visit: [email or web address] OR  [email or web address] OR  https://www.cdc.gov/steadi/patient.html

## 2025-01-08 NOTE — CHART NOTE - NSCHARTNOTEFT_GEN_A_CORE
PACU ANESTHESIA ADMISSION NOTE      Procedure:   Post op diagnosis:      ____  Intubated  TV:______       Rate: ______      FiO2: ______    __x__  Patent Airway    _x___  Full return of protective reflexes    __x__  Full recovery from anesthesia / back to baseline     Vitals:   T:  97.3         R:  16                BP: 120/61                 Sat:  98                 P: 72       Mental Status:  __x__ Awake   __x___ Alert   _____ Drowsy   _____ Sedated    Nausea/Vomiting:  __x__ NO  ______Yes,   See Post - Op Orders          Pain Scale (0-10):  _____    Treatment: ____ None    __x__ See Post - Op/PCA Orders    Post - Operative Fluids:   ____ Oral   __x__ See Post - Op Orders    Plan: Discharge:   _x___Home       _____Floor     _____Critical Care    _____  Other:_________________    Comments:

## 2025-01-08 NOTE — ASU DISCHARGE PLAN (ADULT/PEDIATRIC) - FINANCIAL ASSISTANCE
[facility] provides services at a reduced cost to those who are determined to be eligible through [facility]’s financial assistance program. Information regarding [facility]’s financial assistance program can be found by going to [email or web address] or by calling [phone].

## 2025-01-08 NOTE — PRE PROCEDURE NOTE - PRE PROCEDURE EVALUATION
74-year-old man with history of bladder cancer, renal cancer, hypertension, hyperlipidemia, CKD 5 not yet on hemodialysis recently seen by Dr. Cordoba for evaluation and discussion regarding percutaneous endovascular AV fistula creation. As per note, ultrasound evaluation of the right upper extremity demonstrates adequate vasculature for percutaneous endovascular AV fistula creation. Patient presented on 12/30 for fistula creation but due to hyperkalemia (treated with Lokelma) procedure was rescheduled to today.    Procedure pending repeat bloodwork today. Denies any antiplt/anticoagulation. No allergies to medications.    Plan for image guided right AV fistula creation with conscious sedation / anesthesia today 1/8

## 2025-01-08 NOTE — ASU PATIENT PROFILE, ADULT - NSICDXPASTMEDICALHX_GEN_ALL_CORE_FT
PAST MEDICAL HISTORY:  Colon cancer     ESRD needing dialysis     History of bladder surgery     HLD (hyperlipidemia)     HTN (hypertension)     Prostate cancer     Ureter cancer, right

## 2025-01-15 ENCOUNTER — NON-APPOINTMENT (OUTPATIENT)
Age: 75
End: 2025-01-15

## 2025-01-16 ENCOUNTER — APPOINTMENT (OUTPATIENT)
Dept: INTERVENTIONAL RADIOLOGY/VASCULAR | Facility: CLINIC | Age: 75
End: 2025-01-16

## 2025-01-27 ENCOUNTER — NON-APPOINTMENT (OUTPATIENT)
Age: 75
End: 2025-01-27

## 2025-02-09 PROBLEM — S92.351A FRACTURE OF BASE OF FIFTH METATARSAL BONE OF RIGHT FOOT: Status: ACTIVE | Noted: 2025-02-09

## 2025-02-10 ENCOUNTER — NON-APPOINTMENT (OUTPATIENT)
Age: 75
End: 2025-02-10

## 2025-02-19 ENCOUNTER — OUTPATIENT (OUTPATIENT)
Dept: OUTPATIENT SERVICES | Facility: HOSPITAL | Age: 75
LOS: 1 days | Discharge: HOME | End: 2025-02-19
Payer: MEDICARE

## 2025-02-19 ENCOUNTER — TRANSCRIPTION ENCOUNTER (OUTPATIENT)
Age: 75
End: 2025-02-19

## 2025-02-19 VITALS
TEMPERATURE: 98 F | HEART RATE: 75 BPM | RESPIRATION RATE: 18 BRPM | HEIGHT: 68 IN | SYSTOLIC BLOOD PRESSURE: 138 MMHG | OXYGEN SATURATION: 99 % | DIASTOLIC BLOOD PRESSURE: 65 MMHG

## 2025-02-19 VITALS
SYSTOLIC BLOOD PRESSURE: 129 MMHG | OXYGEN SATURATION: 98 % | DIASTOLIC BLOOD PRESSURE: 62 MMHG | HEART RATE: 62 BPM | RESPIRATION RATE: 18 BRPM

## 2025-02-19 DIAGNOSIS — Z90.49 ACQUIRED ABSENCE OF OTHER SPECIFIED PARTS OF DIGESTIVE TRACT: Chronic | ICD-10-CM

## 2025-02-19 DIAGNOSIS — N18.9 CHRONIC KIDNEY DISEASE, UNSPECIFIED: ICD-10-CM

## 2025-02-19 DIAGNOSIS — Z98.890 OTHER SPECIFIED POSTPROCEDURAL STATES: Chronic | ICD-10-CM

## 2025-02-19 DIAGNOSIS — Z90.5 ACQUIRED ABSENCE OF KIDNEY: Chronic | ICD-10-CM

## 2025-02-19 DIAGNOSIS — Z98.49 CATARACT EXTRACTION STATUS, UNSPECIFIED EYE: Chronic | ICD-10-CM

## 2025-02-19 LAB — GAS PNL BLDV: SIGNIFICANT CHANGE UP

## 2025-02-19 PROCEDURE — 83605 ASSAY OF LACTIC ACID: CPT

## 2025-02-19 PROCEDURE — 85018 HEMOGLOBIN: CPT

## 2025-02-19 PROCEDURE — C1753: CPT

## 2025-02-19 PROCEDURE — 82330 ASSAY OF CALCIUM: CPT

## 2025-02-19 PROCEDURE — 76937 US GUIDE VASCULAR ACCESS: CPT

## 2025-02-19 PROCEDURE — 36907 BALO ANGIOP CTR DIALYSIS SEG: CPT

## 2025-02-19 PROCEDURE — 84295 ASSAY OF SERUM SODIUM: CPT

## 2025-02-19 PROCEDURE — C1894: CPT

## 2025-02-19 PROCEDURE — 36902 INTRO CATH DIALYSIS CIRCUIT: CPT

## 2025-02-19 PROCEDURE — C1769: CPT

## 2025-02-19 PROCEDURE — 76937 US GUIDE VASCULAR ACCESS: CPT | Mod: 26

## 2025-02-19 PROCEDURE — 82803 BLOOD GASES ANY COMBINATION: CPT

## 2025-02-19 PROCEDURE — 85014 HEMATOCRIT: CPT

## 2025-02-19 PROCEDURE — 84132 ASSAY OF SERUM POTASSIUM: CPT

## 2025-02-19 PROCEDURE — C1725: CPT

## 2025-02-19 RX ORDER — ATORVASTATIN CALCIUM 80 MG/1
1 TABLET, FILM COATED ORAL
Refills: 0 | DISCHARGE

## 2025-02-19 NOTE — H&P ADULT - HISTORY OF PRESENT ILLNESS
74-year-old man past medical history significant for hypertension, dyslipidemia, CKD 4, nephrolithiasis, urothelial carcinoma status post nephrectomy and bladder removal, with ileal conduit urinary diversion urostomy presents for right upper extremity AV Fistulogram.

## 2025-02-19 NOTE — ASU DISCHARGE PLAN (ADULT/PEDIATRIC) - ASU DC SPECIAL INSTRUCTIONSFT
Follow up with Dr. Cordoba in 4 weeks.     Please contact Dr. Cordoba if you nephrologist says you need to start hemodialysis.

## 2025-02-19 NOTE — H&P ADULT - NSHPPHYSICALEXAM_GEN_ALL_CORE
AAOX3. Upper extremities pulses palpable. Right upper arm thrill palpable. No signs of infection, clean dry and intact. No redness noted.

## 2025-02-19 NOTE — H&P ADULT - NSHPREVIEWOFSYSTEMS_GEN_ALL_CORE
Patient is AAOX3, Denies chest pain, pain, SOB, or palpitations. Patient right and left upper extremities clean dry and intact, no signs of infection. Pulses are palpable. No swelling noted.

## 2025-02-19 NOTE — ASU DISCHARGE PLAN (ADULT/PEDIATRIC) - FINANCIAL ASSISTANCE
[facility] provides services at a reduced cost to those who are determined to be eligible through [facility]’s financial assistance program. Information regarding [facility]’s financial assistance program can be found by going to [email or web address] or by calling [phone].

## 2025-02-27 ENCOUNTER — APPOINTMENT (OUTPATIENT)
Dept: ORTHOPEDIC SURGERY | Facility: CLINIC | Age: 75
End: 2025-02-27

## 2025-02-27 DIAGNOSIS — S92.351A DISPLACED FRACTURE OF FIFTH METATARSAL BONE, RIGHT FOOT, INITIAL ENCOUNTER FOR CLOSED FRACTURE: ICD-10-CM

## 2025-03-20 ENCOUNTER — APPOINTMENT (OUTPATIENT)
Dept: INTERVENTIONAL RADIOLOGY/VASCULAR | Facility: CLINIC | Age: 75
End: 2025-03-20
Payer: MEDICARE

## 2025-03-20 VITALS
HEART RATE: 81 BPM | DIASTOLIC BLOOD PRESSURE: 79 MMHG | OXYGEN SATURATION: 99 % | TEMPERATURE: 97.3 F | SYSTOLIC BLOOD PRESSURE: 154 MMHG

## 2025-03-20 PROCEDURE — 99212 OFFICE O/P EST SF 10 MIN: CPT
